# Patient Record
Sex: FEMALE | Race: OTHER | HISPANIC OR LATINO | URBAN - METROPOLITAN AREA
[De-identification: names, ages, dates, MRNs, and addresses within clinical notes are randomized per-mention and may not be internally consistent; named-entity substitution may affect disease eponyms.]

---

## 2017-04-09 ENCOUNTER — EMERGENCY (EMERGENCY)
Facility: HOSPITAL | Age: 27
LOS: 1 days | Discharge: HOME | End: 2017-04-09
Attending: EMERGENCY MEDICINE | Admitting: EMERGENCY MEDICINE
Payer: COMMERCIAL

## 2017-04-09 VITALS
OXYGEN SATURATION: 100 % | HEART RATE: 64 BPM | RESPIRATION RATE: 18 BRPM | SYSTOLIC BLOOD PRESSURE: 114 MMHG | DIASTOLIC BLOOD PRESSURE: 59 MMHG

## 2017-04-09 VITALS
HEART RATE: 85 BPM | OXYGEN SATURATION: 98 % | TEMPERATURE: 98 F | HEIGHT: 67 IN | RESPIRATION RATE: 18 BRPM | WEIGHT: 229.94 LBS | SYSTOLIC BLOOD PRESSURE: 128 MMHG | DIASTOLIC BLOOD PRESSURE: 83 MMHG

## 2017-04-09 DIAGNOSIS — O13.2 GESTATIONAL [PREGNANCY-INDUCED] HYPERTENSION WITHOUT SIGNIFICANT PROTEINURIA, SECOND TRIMESTER: ICD-10-CM

## 2017-04-09 DIAGNOSIS — Z3A.19 19 WEEKS GESTATION OF PREGNANCY: ICD-10-CM

## 2017-04-09 LAB
ALBUMIN SERPL ELPH-MCNC: 2.9 G/DL — LOW (ref 3.4–5)
ALP SERPL-CCNC: 72 U/L — SIGNIFICANT CHANGE UP (ref 40–120)
ALT FLD-CCNC: 18 U/L — SIGNIFICANT CHANGE UP (ref 12–42)
ANION GAP SERPL CALC-SCNC: 6 MMOL/L — LOW (ref 9–16)
APPEARANCE UR: CLEAR — SIGNIFICANT CHANGE UP
AST SERPL-CCNC: 22 U/L — SIGNIFICANT CHANGE UP (ref 15–37)
BILIRUB SERPL-MCNC: 0.4 MG/DL — SIGNIFICANT CHANGE UP (ref 0.2–1.2)
BILIRUB UR-MCNC: NEGATIVE — SIGNIFICANT CHANGE UP
BUN SERPL-MCNC: 5 MG/DL — LOW (ref 7–23)
CALCIUM SERPL-MCNC: 8.5 MG/DL — SIGNIFICANT CHANGE UP (ref 8.5–10.5)
CHLORIDE SERPL-SCNC: 108 MMOL/L — SIGNIFICANT CHANGE UP (ref 96–108)
CO2 SERPL-SCNC: 25 MMOL/L — SIGNIFICANT CHANGE UP (ref 22–31)
COLOR SPEC: YELLOW — SIGNIFICANT CHANGE UP
CREAT SERPL-MCNC: 0.54 MG/DL — SIGNIFICANT CHANGE UP (ref 0.5–1.3)
DIFF PNL FLD: (no result)
GLUCOSE SERPL-MCNC: 84 MG/DL — SIGNIFICANT CHANGE UP (ref 70–99)
GLUCOSE UR QL: NEGATIVE — SIGNIFICANT CHANGE UP
HCT VFR BLD CALC: 32 % — LOW (ref 34.5–45)
HGB BLD-MCNC: 11.5 G/DL — SIGNIFICANT CHANGE UP (ref 11.5–15.5)
KETONES UR-MCNC: NEGATIVE — SIGNIFICANT CHANGE UP
LDH SERPL L TO P-CCNC: 139 U/L — SIGNIFICANT CHANGE UP (ref 84–246)
LEUKOCYTE ESTERASE UR-ACNC: NEGATIVE — SIGNIFICANT CHANGE UP
MCHC RBC-ENTMCNC: 30.1 PG — SIGNIFICANT CHANGE UP (ref 27–34)
MCHC RBC-ENTMCNC: 35.9 G/DL — SIGNIFICANT CHANGE UP (ref 32–36)
MCV RBC AUTO: 83.8 FL — SIGNIFICANT CHANGE UP (ref 80–100)
NITRITE UR-MCNC: NEGATIVE — SIGNIFICANT CHANGE UP
PH UR: 7 — SIGNIFICANT CHANGE UP (ref 4–8)
PLATELET # BLD AUTO: 273 K/UL — SIGNIFICANT CHANGE UP (ref 150–400)
POTASSIUM SERPL-MCNC: 4.1 MMOL/L — SIGNIFICANT CHANGE UP (ref 3.5–5.3)
POTASSIUM SERPL-SCNC: 4.1 MMOL/L — SIGNIFICANT CHANGE UP (ref 3.5–5.3)
PROT SERPL-MCNC: 6.9 G/DL — SIGNIFICANT CHANGE UP (ref 6.4–8.2)
PROT UR-MCNC: NEGATIVE MG/DL — SIGNIFICANT CHANGE UP
RBC # BLD: 3.82 M/UL — SIGNIFICANT CHANGE UP (ref 3.8–5.2)
RBC # FLD: 12.9 % — SIGNIFICANT CHANGE UP (ref 10.3–16.9)
SODIUM SERPL-SCNC: 139 MMOL/L — SIGNIFICANT CHANGE UP (ref 135–145)
SP GR SPEC: <=1.005 — SIGNIFICANT CHANGE UP (ref 1–1.03)
URATE SERPL-MCNC: 4.7 MG/DL — SIGNIFICANT CHANGE UP (ref 2.6–6)
UROBILINOGEN FLD QL: 0.2 E.U./DL — SIGNIFICANT CHANGE UP
WBC # BLD: 10.2 K/UL — SIGNIFICANT CHANGE UP (ref 3.8–10.5)
WBC # FLD AUTO: 10.2 K/UL — SIGNIFICANT CHANGE UP (ref 3.8–10.5)

## 2017-04-09 PROCEDURE — 81003 URINALYSIS AUTO W/O SCOPE: CPT

## 2017-04-09 PROCEDURE — 99284 EMERGENCY DEPT VISIT MOD MDM: CPT

## 2017-04-09 PROCEDURE — 80053 COMPREHEN METABOLIC PANEL: CPT

## 2017-04-09 PROCEDURE — 81001 URINALYSIS AUTO W/SCOPE: CPT

## 2017-04-09 PROCEDURE — 36415 COLL VENOUS BLD VENIPUNCTURE: CPT

## 2017-04-09 PROCEDURE — 99284 EMERGENCY DEPT VISIT MOD MDM: CPT | Mod: 25

## 2017-04-09 PROCEDURE — 84550 ASSAY OF BLOOD/URIC ACID: CPT

## 2017-04-09 PROCEDURE — 85027 COMPLETE CBC AUTOMATED: CPT

## 2017-04-09 PROCEDURE — 83615 LACTATE (LD) (LDH) ENZYME: CPT

## 2017-04-09 NOTE — ED PROVIDER NOTE - OBJECTIVE STATEMENT
27F  at 19 weeks and 6 days sent in from gyn office for concern for hypertension, concern for pre-eclampsia. Hx of pre-eclampsia with first pregnancy requiring delivery at 35 weeks. Pt denies hx of hypertension, has pregnancy-induced hypertension only. No vision changes, no blurred vision, no headache, no seizures, no abdominal pain, no leg swelling. No chest pain, no shortness of breath. Does not take any medications, denies drug use. 27F  at 19 weeks and 6 days sent in from gyn office for concern for hypertension, concern for pre-eclampsia. Hx of pre-eclampsia with first pregnancy requiring delivery at 35 weeks. Pt denies hx of hypertension, has pregnancy-induced hypertension only. No vision changes, no blurred vision, no headache, no seizures, no abdominal pain, no leg swelling. No chest pain, no shortness of breath. Does not take any medications, denies drug use. Pt has placenta previa.

## 2017-04-09 NOTE — ED ADULT TRIAGE NOTE - CHIEF COMPLAINT QUOTE
Pt directed to ED by PCP for Preeclampsia.  19 weeks pregnant.  .  Pt CO mild Nausea.  Pt denies vomiting, Diarrhea, SOB, CP, Dizziness

## 2017-04-09 NOTE — ED ADULT NURSE NOTE - OBJECTIVE STATEMENT
Pt A&O3 ,19wks pregnant,  referred for elevated B/P, denies any CP,  denies SOB , denies any other complaints . On arrival to ED Pt's VSS  and B/P within normal limits  , continue monitoring

## 2017-04-09 NOTE — ED PROVIDER NOTE - NEUROLOGICAL, MLM
Alert and oriented, no focal deficits, no motor or sensory deficits. symmetric reflexes 2+ patella/achilles

## 2017-04-09 NOTE — ED PROVIDER NOTE - PROGRESS NOTE DETAILS
Spoke to Dr. Armando on phone, if blood pressures remain normal and pre-eclampsia labs are normal, can discharge patient home. Pt manjit PO, bp wnl, labs unremarkable, asymptomatic will dc home.

## 2017-04-09 NOTE — ED PROVIDER NOTE - MEDICAL DECISION MAKING DETAILS
27F with concern for pre-eclampsia, sent in by gynecology for concern for hypertension readings at clinic. Pt is asymptomatic for pre-eclampsia, no abd pain, vision changes, leg swelling or other concerns. Will send pre-eclampsia labs including cbc, liver labs, uric acid, ldh. Will continue to recycle blood pressures, no elevated bp of 140/90 or greater, will discharge home to f/u with gynecologist.

## 2017-04-09 NOTE — ED ADULT NURSE REASSESSMENT NOTE - NS ED NURSE REASSESS COMMENT FT1
Pt denies pain at this time. Pt placed on telemetry monitoring as per MD. Pt denies headache, blurred vision or numbness or tingling in BL UE and LE.  Pt stable at this time with family at bedside. Will continue to monitor.

## 2017-04-09 NOTE — ED ADULT NURSE NOTE - CHPI ED SYMPTOMS NEG
no cough/no syncope/no fever/no dizziness/no shortness of breath/no nausea/no diaphoresis/no back pain/no chills/no vomiting

## 2017-04-26 ENCOUNTER — OUTPATIENT (OUTPATIENT)
Dept: OUTPATIENT SERVICES | Age: 27
LOS: 1 days | Discharge: HOME | End: 2017-04-26

## 2017-04-26 PROBLEM — Z00.00 ENCOUNTER FOR PREVENTIVE HEALTH EXAMINATION: Status: ACTIVE | Noted: 2017-04-26

## 2017-05-01 ENCOUNTER — APPOINTMENT (OUTPATIENT)
Dept: PEDIATRIC CARDIOLOGY | Facility: CLINIC | Age: 27
End: 2017-05-01

## 2017-05-22 ENCOUNTER — APPOINTMENT (OUTPATIENT)
Dept: PEDIATRIC CARDIOLOGY | Facility: CLINIC | Age: 27
End: 2017-05-22

## 2017-08-04 ENCOUNTER — OUTPATIENT (OUTPATIENT)
Dept: OUTPATIENT SERVICES | Facility: HOSPITAL | Age: 27
LOS: 1 days | End: 2017-08-04

## 2017-08-04 ENCOUNTER — EMERGENCY (EMERGENCY)
Facility: HOSPITAL | Age: 27
LOS: 1 days | Discharge: STILL ADMITTED OR ADMINISTRATIVE | End: 2017-08-04
Admitting: EMERGENCY MEDICINE

## 2017-08-04 VITALS
TEMPERATURE: 98 F | RESPIRATION RATE: 18 BRPM | DIASTOLIC BLOOD PRESSURE: 79 MMHG | OXYGEN SATURATION: 98 % | SYSTOLIC BLOOD PRESSURE: 145 MMHG | HEART RATE: 74 BPM

## 2017-08-04 DIAGNOSIS — O26.899 OTHER SPECIFIED PREGNANCY RELATED CONDITIONS, UNSPECIFIED TRIMESTER: ICD-10-CM

## 2017-08-04 DIAGNOSIS — Z3A.00 WEEKS OF GESTATION OF PREGNANCY NOT SPECIFIED: ICD-10-CM

## 2017-08-04 LAB
APPEARANCE UR: SIGNIFICANT CHANGE UP
BACTERIA # UR AUTO: SIGNIFICANT CHANGE UP
BILIRUB UR-MCNC: NEGATIVE — SIGNIFICANT CHANGE UP
BLD GP AB SCN SERPL QL: NEGATIVE — SIGNIFICANT CHANGE UP
BLOOD UR QL VISUAL: NEGATIVE — SIGNIFICANT CHANGE UP
COLOR SPEC: YELLOW — SIGNIFICANT CHANGE UP
GLUCOSE UR-MCNC: NEGATIVE — SIGNIFICANT CHANGE UP
HCT VFR BLD CALC: 30.5 % — LOW (ref 34.5–45)
HGB BLD-MCNC: 10 G/DL — LOW (ref 11.5–15.5)
KETONES UR-MCNC: SIGNIFICANT CHANGE UP
LEUKOCYTE ESTERASE UR-ACNC: SIGNIFICANT CHANGE UP
MCHC RBC-ENTMCNC: 26.1 PG — LOW (ref 27–34)
MCHC RBC-ENTMCNC: 32.8 % — SIGNIFICANT CHANGE UP (ref 32–36)
MCV RBC AUTO: 79.6 FL — LOW (ref 80–100)
MUCOUS THREADS # UR AUTO: SIGNIFICANT CHANGE UP
NITRITE UR-MCNC: NEGATIVE — SIGNIFICANT CHANGE UP
NRBC # FLD: 0 — SIGNIFICANT CHANGE UP
PH UR: 6 — SIGNIFICANT CHANGE UP (ref 4.6–8)
PLATELET # BLD AUTO: 338 K/UL — SIGNIFICANT CHANGE UP (ref 150–400)
PMV BLD: 9.4 FL — SIGNIFICANT CHANGE UP (ref 7–13)
PROT UR-MCNC: 20 — SIGNIFICANT CHANGE UP
RBC # BLD: 3.83 M/UL — SIGNIFICANT CHANGE UP (ref 3.8–5.2)
RBC # FLD: 14.7 % — HIGH (ref 10.3–14.5)
RBC CASTS # UR COMP ASSIST: SIGNIFICANT CHANGE UP (ref 0–?)
RH IG SCN BLD-IMP: POSITIVE — SIGNIFICANT CHANGE UP
RH IG SCN BLD-IMP: POSITIVE — SIGNIFICANT CHANGE UP
SP GR SPEC: 1.01 — SIGNIFICANT CHANGE UP (ref 1–1.03)
SQUAMOUS # UR AUTO: SIGNIFICANT CHANGE UP
UROBILINOGEN FLD QL: NORMAL E.U. — SIGNIFICANT CHANGE UP (ref 0.1–0.2)
WBC # BLD: 10.5 K/UL — SIGNIFICANT CHANGE UP (ref 3.8–10.5)
WBC # FLD AUTO: 10.5 K/UL — SIGNIFICANT CHANGE UP (ref 3.8–10.5)
WBC UR QL: SIGNIFICANT CHANGE UP (ref 0–?)

## 2017-08-04 RX ORDER — SODIUM CHLORIDE 9 MG/ML
1000 INJECTION, SOLUTION INTRAVENOUS
Qty: 0 | Refills: 0 | Status: DISCONTINUED | OUTPATIENT
Start: 2017-08-04 | End: 2017-08-19

## 2017-08-04 RX ORDER — SODIUM CHLORIDE 9 MG/ML
500 INJECTION, SOLUTION INTRAVENOUS ONCE
Qty: 0 | Refills: 0 | Status: DISCONTINUED | OUTPATIENT
Start: 2017-08-04 | End: 2017-08-04

## 2017-08-07 ENCOUNTER — INPATIENT (INPATIENT)
Facility: HOSPITAL | Age: 27
LOS: 0 days | Discharge: HOME | DRG: 780 | End: 2017-08-08
Attending: OBSTETRICS & GYNECOLOGY | Admitting: OBSTETRICS & GYNECOLOGY
Payer: MEDICAID

## 2017-08-07 DIAGNOSIS — O26.899 OTHER SPECIFIED PREGNANCY RELATED CONDITIONS, UNSPECIFIED TRIMESTER: ICD-10-CM

## 2017-08-07 DIAGNOSIS — Z3A.00 WEEKS OF GESTATION OF PREGNANCY NOT SPECIFIED: ICD-10-CM

## 2017-08-07 DIAGNOSIS — Z34.80 ENCOUNTER FOR SUPERVISION OF OTHER NORMAL PREGNANCY, UNSPECIFIED TRIMESTER: ICD-10-CM

## 2017-08-07 LAB
APTT BLD: 25 SEC — LOW (ref 27.5–37.4)
BASOPHILS # BLD AUTO: 0 K/UL — SIGNIFICANT CHANGE UP (ref 0–0.2)
BASOPHILS NFR BLD AUTO: 0.4 % — SIGNIFICANT CHANGE UP (ref 0–2)
EOSINOPHIL # BLD AUTO: 0 K/UL — SIGNIFICANT CHANGE UP (ref 0–0.5)
EOSINOPHIL NFR BLD AUTO: 0.5 % — SIGNIFICANT CHANGE UP (ref 0–6)
FIBRINOGEN PPP-MCNC: 671 MG/DL — HIGH (ref 310–510)
HBV SURFACE AG SERPL QL IA: SIGNIFICANT CHANGE UP
HCT VFR BLD CALC: 32.9 % — LOW (ref 34.5–45)
HGB BLD-MCNC: 10.5 G/DL — LOW (ref 11.5–15.5)
INR BLD: 1.04 RATIO — SIGNIFICANT CHANGE UP (ref 0.88–1.16)
LYMPHOCYTES # BLD AUTO: 1.9 K/UL — SIGNIFICANT CHANGE UP (ref 1–3.3)
LYMPHOCYTES # BLD AUTO: 18.5 % — SIGNIFICANT CHANGE UP (ref 13–44)
MCHC RBC-ENTMCNC: 26.3 PG — LOW (ref 27–34)
MCHC RBC-ENTMCNC: 32 GM/DL — SIGNIFICANT CHANGE UP (ref 32–36)
MCV RBC AUTO: 82 FL — SIGNIFICANT CHANGE UP (ref 80–100)
MONOCYTES # BLD AUTO: 0.4 K/UL — SIGNIFICANT CHANGE UP (ref 0–0.9)
MONOCYTES NFR BLD AUTO: 3.6 % — SIGNIFICANT CHANGE UP (ref 2–14)
NEUTROPHILS # BLD AUTO: 7.8 K/UL — HIGH (ref 1.8–7.4)
NEUTROPHILS NFR BLD AUTO: 77 % — SIGNIFICANT CHANGE UP (ref 43–77)
PLATELET # BLD AUTO: 321 K/UL — SIGNIFICANT CHANGE UP (ref 150–400)
PROTHROM AB SERPL-ACNC: 11.4 SEC — SIGNIFICANT CHANGE UP (ref 9.8–12.7)
RBC # BLD: 4.01 M/UL — SIGNIFICANT CHANGE UP (ref 3.8–5.2)
RBC # FLD: 14.7 % — HIGH (ref 10.3–14.5)
WBC # BLD: 10.2 K/UL — SIGNIFICANT CHANGE UP (ref 3.8–10.5)
WBC # FLD AUTO: 10.2 K/UL — SIGNIFICANT CHANGE UP (ref 3.8–10.5)

## 2017-08-07 PROCEDURE — 85730 THROMBOPLASTIN TIME PARTIAL: CPT

## 2017-08-07 PROCEDURE — 85384 FIBRINOGEN ACTIVITY: CPT

## 2017-08-07 PROCEDURE — 86850 RBC ANTIBODY SCREEN: CPT

## 2017-08-07 PROCEDURE — 85027 COMPLETE CBC AUTOMATED: CPT

## 2017-08-07 PROCEDURE — 86900 BLOOD TYPING SEROLOGIC ABO: CPT

## 2017-08-07 PROCEDURE — 59025 FETAL NON-STRESS TEST: CPT

## 2017-08-07 PROCEDURE — 86901 BLOOD TYPING SEROLOGIC RH(D): CPT

## 2017-08-07 PROCEDURE — 86920 COMPATIBILITY TEST SPIN: CPT

## 2017-08-07 PROCEDURE — G0463: CPT

## 2017-08-07 PROCEDURE — 87340 HEPATITIS B SURFACE AG IA: CPT

## 2017-08-07 PROCEDURE — 86780 TREPONEMA PALLIDUM: CPT

## 2017-08-07 PROCEDURE — 85610 PROTHROMBIN TIME: CPT

## 2017-08-07 RX ORDER — CITRIC ACID/SODIUM CITRATE 300-500 MG
30 SOLUTION, ORAL ORAL ONCE
Qty: 0 | Refills: 0 | Status: DISCONTINUED | OUTPATIENT
Start: 2017-08-07 | End: 2017-08-08

## 2017-08-07 RX ORDER — SODIUM CHLORIDE 9 MG/ML
1000 INJECTION, SOLUTION INTRAVENOUS ONCE
Qty: 0 | Refills: 0 | Status: COMPLETED | OUTPATIENT
Start: 2017-08-07 | End: 2017-08-07

## 2017-08-07 RX ORDER — TENOFOVIR DISOPROXIL FUMARATE 300 MG/1
300 TABLET, FILM COATED ORAL
Qty: 0 | Refills: 0 | Status: DISCONTINUED | OUTPATIENT
Start: 2017-08-07 | End: 2017-08-07

## 2017-08-07 RX ORDER — EMTRICITABINE, RILPIVIRINE HYDROCHLORIDE, AND TENOFOVIR DISOPROXIL FUMARATE 200; 25; 300 MG/1; MG/1; MG/1
1 TABLET, FILM COATED ORAL
Qty: 0 | Refills: 0 | Status: DISCONTINUED | OUTPATIENT
Start: 2017-08-07 | End: 2017-08-07

## 2017-08-07 RX ORDER — CEFAZOLIN SODIUM 1 G
2000 VIAL (EA) INJECTION ONCE
Qty: 0 | Refills: 0 | Status: DISCONTINUED | OUTPATIENT
Start: 2017-08-07 | End: 2017-08-08

## 2017-08-07 RX ORDER — EMTRICITABINE AND TENOFOVIR DISOPROXIL FUMARATE 200; 300 MG/1; MG/1
1 TABLET, FILM COATED ORAL DAILY
Qty: 0 | Refills: 0 | Status: DISCONTINUED | OUTPATIENT
Start: 2017-08-07 | End: 2017-08-08

## 2017-08-07 RX ORDER — METOCLOPRAMIDE HCL 10 MG
10 TABLET ORAL ONCE
Qty: 0 | Refills: 0 | Status: DISCONTINUED | OUTPATIENT
Start: 2017-08-07 | End: 2017-08-08

## 2017-08-07 RX ORDER — RILPIVIRINE HYDROCHLORIDE 25 MG/1
25 TABLET, FILM COATED ORAL
Qty: 0 | Refills: 0 | Status: DISCONTINUED | OUTPATIENT
Start: 2017-08-07 | End: 2017-08-08

## 2017-08-07 RX ORDER — SODIUM CHLORIDE 9 MG/ML
1000 INJECTION, SOLUTION INTRAVENOUS
Qty: 0 | Refills: 0 | Status: DISCONTINUED | OUTPATIENT
Start: 2017-08-07 | End: 2017-08-08

## 2017-08-07 RX ORDER — EMTRICITABINE 200 MG/1
200 CAPSULE ORAL DAILY
Qty: 0 | Refills: 0 | Status: DISCONTINUED | OUTPATIENT
Start: 2017-08-07 | End: 2017-08-07

## 2017-08-07 RX ADMIN — SODIUM CHLORIDE 2000 MILLILITER(S): 9 INJECTION, SOLUTION INTRAVENOUS at 17:45

## 2017-08-08 ENCOUNTER — TRANSCRIPTION ENCOUNTER (OUTPATIENT)
Age: 27
End: 2017-08-08

## 2017-08-08 LAB — T PALLIDUM AB TITR SER: NEGATIVE — SIGNIFICANT CHANGE UP

## 2017-08-08 RX ORDER — EMTRICITABINE, RILPIVIRINE HYDROCHLORIDE, AND TENOFOVIR DISOPROXIL FUMARATE 200; 25; 300 MG/1; MG/1; MG/1
1 TABLET, FILM COATED ORAL
Qty: 0 | Refills: 0 | COMMUNITY

## 2017-08-08 NOTE — DISCHARGE NOTE ANTEPARTUM - HOSPITAL COURSE
patient presented to triage with c./o contractions h/o previous c/s for preeclampsia h/o HIV undetected viral load. patient h/o placenta previa which has resolved posterior on ultrasound by M 7/31. labor was ruled out.   patient instructed to f.u within 48hrs of discharge.  continue prenatal vitamins, f/u with Dr. Olanescu tomorrow as discussed, labor precautions given, kick count instructions given, return to labor room with any maternal.fetal concerns, d/w Dr. Olanescu at bedside

## 2017-08-08 NOTE — DISCHARGE NOTE ANTEPARTUM - MEDICATION SUMMARY - MEDICATIONS TO TAKE
I will START or STAY ON the medications listed below when I get home from the hospital:    Prenatal Multivitamins oral tablet  --  by mouth   -- Indication: For pregnancy

## 2017-08-08 NOTE — DISCHARGE NOTE ANTEPARTUM - ADDITIONAL INSTRUCTIONS
continue prenatal vitamins, f/u with Dr. Olanescu tomorrow as discussed, labor precautions given, kick count instructions given, return to labor room with any maternal.fetal concerns, d/w Dr. Olanescu at bedside

## 2017-08-08 NOTE — DISCHARGE NOTE ANTEPARTUM - PATIENT PORTAL LINK FT
“You can access the Follow[employer] Patient Portal, offered by [facility], by registering with the following website: [email or web address]”

## 2017-08-08 NOTE — DISCHARGE NOTE ANTEPARTUM - PLAN OF CARE
r/o labor, no cervical change continue prenatal vitamins, f/u with Dr. Olanescu tomorrow as discussed, labor precautions given, kick count instructions given, return to labor room with any maternal.fetal concerns, d/w Dr. Olanescu at bedside

## 2017-08-08 NOTE — DISCHARGE NOTE ANTEPARTUM - CARE PROVIDER_API CALL
Olanescu, Andrea D (MD), Obstetrics and Gynecology  [address]  [address] [place] [address]  Phone: [phone]  Fax: [phone]    Jose Bingham), Obstetrics  Gynecology  [address] [facility] [facility]  [address]  [place] [address]  Phone: [phone]  Fax: [phone]

## 2017-08-14 DIAGNOSIS — O47.1 FALSE LABOR AT OR AFTER 37 COMPLETED WEEKS OF GESTATION: ICD-10-CM

## 2017-08-15 ENCOUNTER — RESULT REVIEW (OUTPATIENT)
Age: 27
End: 2017-08-15

## 2017-08-15 ENCOUNTER — INPATIENT (INPATIENT)
Facility: HOSPITAL | Age: 27
LOS: 2 days | Discharge: HOME | End: 2017-08-18
Attending: OBSTETRICS & GYNECOLOGY | Admitting: OBSTETRICS & GYNECOLOGY
Payer: MEDICAID

## 2017-08-15 ENCOUNTER — TRANSCRIPTION ENCOUNTER (OUTPATIENT)
Age: 27
End: 2017-08-15

## 2017-08-15 VITALS — WEIGHT: 209.44 LBS

## 2017-08-15 DIAGNOSIS — Z3A.37 37 WEEKS GESTATION OF PREGNANCY: ICD-10-CM

## 2017-08-15 DIAGNOSIS — Z21 ASYMPTOMATIC HUMAN IMMUNODEFICIENCY VIRUS [HIV] INFECTION STATUS: ICD-10-CM

## 2017-08-15 DIAGNOSIS — Z3A.39 39 WEEKS GESTATION OF PREGNANCY: ICD-10-CM

## 2017-08-15 DIAGNOSIS — O98.713 HUMAN IMMUNODEFICIENCY VIRUS [HIV] DISEASE COMPLICATING PREGNANCY, THIRD TRIMESTER: ICD-10-CM

## 2017-08-15 LAB
ALBUMIN SERPL ELPH-MCNC: 2.5 G/DL — LOW (ref 3.5–5)
ALP SERPL-CCNC: 166 U/L — HIGH (ref 40–120)
ALT FLD-CCNC: 15 U/L DA — SIGNIFICANT CHANGE UP (ref 10–60)
ANION GAP SERPL CALC-SCNC: 7 MMOL/L — SIGNIFICANT CHANGE UP (ref 5–17)
APTT BLD: 22.5 SEC — LOW (ref 27.5–37.4)
AST SERPL-CCNC: 20 U/L — SIGNIFICANT CHANGE UP (ref 10–40)
BASOPHILS # BLD AUTO: 0 K/UL — SIGNIFICANT CHANGE UP (ref 0–0.2)
BASOPHILS NFR BLD AUTO: 0.4 % — SIGNIFICANT CHANGE UP (ref 0–2)
BILIRUB SERPL-MCNC: 0.6 MG/DL — SIGNIFICANT CHANGE UP (ref 0.2–1.2)
BUN SERPL-MCNC: 4 MG/DL — LOW (ref 7–18)
CALCIUM SERPL-MCNC: 8.5 MG/DL — SIGNIFICANT CHANGE UP (ref 8.4–10.5)
CHLORIDE SERPL-SCNC: 108 MMOL/L — SIGNIFICANT CHANGE UP (ref 96–108)
CO2 SERPL-SCNC: 25 MMOL/L — SIGNIFICANT CHANGE UP (ref 22–31)
CREAT SERPL-MCNC: 0.62 MG/DL — SIGNIFICANT CHANGE UP (ref 0.5–1.3)
EOSINOPHIL # BLD AUTO: 0.1 K/UL — SIGNIFICANT CHANGE UP (ref 0–0.5)
EOSINOPHIL NFR BLD AUTO: 0.6 % — SIGNIFICANT CHANGE UP (ref 0–6)
FIBRINOGEN PPP-MCNC: 596 MG/DL — HIGH (ref 310–510)
GLUCOSE SERPL-MCNC: 95 MG/DL — SIGNIFICANT CHANGE UP (ref 70–99)
HCT VFR BLD CALC: 24.7 % — LOW (ref 34.5–45)
HCT VFR BLD CALC: 31.2 % — LOW (ref 34.5–45)
HGB BLD-MCNC: 10.1 G/DL — LOW (ref 11.5–15.5)
HGB BLD-MCNC: 7.7 G/DL — LOW (ref 11.5–15.5)
INR BLD: 0.99 RATIO — SIGNIFICANT CHANGE UP (ref 0.88–1.16)
LDH SERPL L TO P-CCNC: 128 U/L — SIGNIFICANT CHANGE UP (ref 120–225)
LYMPHOCYTES # BLD AUTO: 2.2 K/UL — SIGNIFICANT CHANGE UP (ref 1–3.3)
LYMPHOCYTES # BLD AUTO: 20.5 % — SIGNIFICANT CHANGE UP (ref 13–44)
MCHC RBC-ENTMCNC: 25.5 PG — LOW (ref 27–34)
MCHC RBC-ENTMCNC: 26.1 PG — LOW (ref 27–34)
MCHC RBC-ENTMCNC: 31.3 GM/DL — LOW (ref 32–36)
MCHC RBC-ENTMCNC: 32.3 GM/DL — SIGNIFICANT CHANGE UP (ref 32–36)
MCV RBC AUTO: 80.8 FL — SIGNIFICANT CHANGE UP (ref 80–100)
MCV RBC AUTO: 81.6 FL — SIGNIFICANT CHANGE UP (ref 80–100)
MONOCYTES # BLD AUTO: 0.5 K/UL — SIGNIFICANT CHANGE UP (ref 0–0.9)
MONOCYTES NFR BLD AUTO: 4.5 % — SIGNIFICANT CHANGE UP (ref 2–14)
NEUTROPHILS # BLD AUTO: 7.9 K/UL — HIGH (ref 1.8–7.4)
NEUTROPHILS NFR BLD AUTO: 74 % — SIGNIFICANT CHANGE UP (ref 43–77)
PLATELET # BLD AUTO: 265 K/UL — SIGNIFICANT CHANGE UP (ref 150–400)
PLATELET # BLD AUTO: 350 K/UL — SIGNIFICANT CHANGE UP (ref 150–400)
POTASSIUM SERPL-MCNC: 3.9 MMOL/L — SIGNIFICANT CHANGE UP (ref 3.5–5.3)
POTASSIUM SERPL-SCNC: 3.9 MMOL/L — SIGNIFICANT CHANGE UP (ref 3.5–5.3)
PROT SERPL-MCNC: 6.6 G/DL — SIGNIFICANT CHANGE UP (ref 6–8.3)
PROTHROM AB SERPL-ACNC: 10.8 SEC — SIGNIFICANT CHANGE UP (ref 9.8–12.7)
RBC # BLD: 3.03 M/UL — LOW (ref 3.8–5.2)
RBC # BLD: 3.86 M/UL — SIGNIFICANT CHANGE UP (ref 3.8–5.2)
RBC # FLD: 14.7 % — HIGH (ref 10.3–14.5)
RBC # FLD: 14.9 % — HIGH (ref 10.3–14.5)
SODIUM SERPL-SCNC: 140 MMOL/L — SIGNIFICANT CHANGE UP (ref 135–145)
URATE SERPL-MCNC: 5.9 MG/DL — SIGNIFICANT CHANGE UP (ref 2.5–7)
WBC # BLD: 10.7 K/UL — HIGH (ref 3.8–10.5)
WBC # BLD: 12.4 K/UL — HIGH (ref 3.8–10.5)
WBC # FLD AUTO: 10.7 K/UL — HIGH (ref 3.8–10.5)
WBC # FLD AUTO: 12.4 K/UL — HIGH (ref 3.8–10.5)

## 2017-08-15 PROCEDURE — 88307 TISSUE EXAM BY PATHOLOGIST: CPT | Mod: 26

## 2017-08-15 RX ORDER — HYDROMORPHONE HYDROCHLORIDE 2 MG/ML
2 INJECTION INTRAMUSCULAR; INTRAVENOUS; SUBCUTANEOUS ONCE
Qty: 0 | Refills: 0 | Status: DISCONTINUED | OUTPATIENT
Start: 2017-08-15 | End: 2017-08-15

## 2017-08-15 RX ORDER — ZOLPIDEM TARTRATE 10 MG/1
5 TABLET ORAL AT BEDTIME
Qty: 0 | Refills: 0 | Status: DISCONTINUED | OUTPATIENT
Start: 2017-08-15 | End: 2017-08-18

## 2017-08-15 RX ORDER — MORPHINE SULFATE 50 MG/1
4 CAPSULE, EXTENDED RELEASE ORAL EVERY 4 HOURS
Qty: 0 | Refills: 0 | Status: DISCONTINUED | OUTPATIENT
Start: 2017-08-15 | End: 2017-08-16

## 2017-08-15 RX ORDER — SODIUM CHLORIDE 9 MG/ML
1000 INJECTION, SOLUTION INTRAVENOUS ONCE
Qty: 0 | Refills: 0 | Status: DISCONTINUED | OUTPATIENT
Start: 2017-08-15 | End: 2017-08-15

## 2017-08-15 RX ORDER — FERROUS SULFATE 325(65) MG
325 TABLET ORAL DAILY
Qty: 0 | Refills: 0 | Status: DISCONTINUED | OUTPATIENT
Start: 2017-08-15 | End: 2017-08-15

## 2017-08-15 RX ORDER — TETANUS TOXOID, REDUCED DIPHTHERIA TOXOID AND ACELLULAR PERTUSSIS VACCINE, ADSORBED 5; 2.5; 8; 8; 2.5 [IU]/.5ML; [IU]/.5ML; UG/.5ML; UG/.5ML; UG/.5ML
0.5 SUSPENSION INTRAMUSCULAR ONCE
Qty: 0 | Refills: 0 | Status: COMPLETED | OUTPATIENT
Start: 2017-08-15 | End: 2018-07-14

## 2017-08-15 RX ORDER — FOLIC ACID 0.8 MG
1 TABLET ORAL DAILY
Qty: 0 | Refills: 0 | Status: DISCONTINUED | OUTPATIENT
Start: 2017-08-16 | End: 2017-08-18

## 2017-08-15 RX ORDER — OXYCODONE AND ACETAMINOPHEN 5; 325 MG/1; MG/1
1 TABLET ORAL
Qty: 0 | Refills: 0 | Status: DISCONTINUED | OUTPATIENT
Start: 2017-08-15 | End: 2017-08-15

## 2017-08-15 RX ORDER — SODIUM CHLORIDE 9 MG/ML
1000 INJECTION, SOLUTION INTRAVENOUS
Qty: 0 | Refills: 0 | Status: DISCONTINUED | OUTPATIENT
Start: 2017-08-15 | End: 2017-08-16

## 2017-08-15 RX ORDER — DIPHENHYDRAMINE HCL 50 MG
25 CAPSULE ORAL EVERY 6 HOURS
Qty: 0 | Refills: 0 | Status: DISCONTINUED | OUTPATIENT
Start: 2017-08-15 | End: 2017-08-18

## 2017-08-15 RX ORDER — OXYCODONE AND ACETAMINOPHEN 5; 325 MG/1; MG/1
2 TABLET ORAL EVERY 4 HOURS
Qty: 0 | Refills: 0 | Status: DISCONTINUED | OUTPATIENT
Start: 2017-08-15 | End: 2017-08-15

## 2017-08-15 RX ORDER — FOLIC ACID 0.8 MG
1 TABLET ORAL DAILY
Qty: 0 | Refills: 0 | Status: DISCONTINUED | OUTPATIENT
Start: 2017-08-15 | End: 2017-08-15

## 2017-08-15 RX ORDER — CEFAZOLIN SODIUM 1 G
2000 VIAL (EA) INJECTION ONCE
Qty: 0 | Refills: 0 | Status: COMPLETED | OUTPATIENT
Start: 2017-08-15 | End: 2017-08-15

## 2017-08-15 RX ORDER — MORPHINE SULFATE 50 MG/1
8 CAPSULE, EXTENDED RELEASE ORAL ONCE
Qty: 0 | Refills: 0 | Status: DISCONTINUED | OUTPATIENT
Start: 2017-08-15 | End: 2017-08-18

## 2017-08-15 RX ORDER — SIMETHICONE 80 MG/1
80 TABLET, CHEWABLE ORAL EVERY 4 HOURS
Qty: 0 | Refills: 0 | Status: DISCONTINUED | OUTPATIENT
Start: 2017-08-15 | End: 2017-08-16

## 2017-08-15 RX ORDER — ENOXAPARIN SODIUM 100 MG/ML
40 INJECTION SUBCUTANEOUS DAILY
Qty: 0 | Refills: 0 | Status: DISCONTINUED | OUTPATIENT
Start: 2017-08-16 | End: 2017-08-18

## 2017-08-15 RX ORDER — OXYTOCIN 10 UNIT/ML
41.67 VIAL (ML) INJECTION
Qty: 20 | Refills: 0 | Status: DISCONTINUED | OUTPATIENT
Start: 2017-08-15 | End: 2017-08-18

## 2017-08-15 RX ORDER — CITRIC ACID/SODIUM CITRATE 300-500 MG
30 SOLUTION, ORAL ORAL ONCE
Qty: 0 | Refills: 0 | Status: COMPLETED | OUTPATIENT
Start: 2017-08-15 | End: 2017-08-15

## 2017-08-15 RX ORDER — ACETAMINOPHEN 500 MG
650 TABLET ORAL EVERY 6 HOURS
Qty: 0 | Refills: 0 | Status: DISCONTINUED | OUTPATIENT
Start: 2017-08-15 | End: 2017-08-18

## 2017-08-15 RX ORDER — DOCUSATE SODIUM 100 MG
100 CAPSULE ORAL
Qty: 0 | Refills: 0 | Status: DISCONTINUED | OUTPATIENT
Start: 2017-08-15 | End: 2017-08-16

## 2017-08-15 RX ORDER — SODIUM CHLORIDE 9 MG/ML
1000 INJECTION, SOLUTION INTRAVENOUS
Qty: 0 | Refills: 0 | Status: DISCONTINUED | OUTPATIENT
Start: 2017-08-15 | End: 2017-08-15

## 2017-08-15 RX ORDER — LANOLIN
1 OINTMENT (GRAM) TOPICAL
Qty: 0 | Refills: 0 | Status: DISCONTINUED | OUTPATIENT
Start: 2017-08-15 | End: 2017-08-18

## 2017-08-15 RX ORDER — FERROUS SULFATE 325(65) MG
325 TABLET ORAL
Qty: 0 | Refills: 0 | Status: DISCONTINUED | OUTPATIENT
Start: 2017-08-16 | End: 2017-08-16

## 2017-08-15 RX ORDER — METOCLOPRAMIDE HCL 10 MG
10 TABLET ORAL ONCE
Qty: 0 | Refills: 0 | Status: DISCONTINUED | OUTPATIENT
Start: 2017-08-15 | End: 2017-08-15

## 2017-08-15 RX ORDER — RILPIVIRINE HYDROCHLORIDE 25 MG/1
25 TABLET, FILM COATED ORAL AT BEDTIME
Qty: 0 | Refills: 0 | Status: DISCONTINUED | OUTPATIENT
Start: 2017-08-16 | End: 2017-08-16

## 2017-08-15 RX ORDER — KETOROLAC TROMETHAMINE 30 MG/ML
30 SYRINGE (ML) INJECTION EVERY 6 HOURS
Qty: 0 | Refills: 0 | Status: DISCONTINUED | OUTPATIENT
Start: 2017-08-15 | End: 2017-08-16

## 2017-08-15 RX ORDER — EMTRICITABINE AND TENOFOVIR DISOPROXIL FUMARATE 200; 300 MG/1; MG/1
1 TABLET, FILM COATED ORAL AT BEDTIME
Qty: 0 | Refills: 0 | Status: DISCONTINUED | OUTPATIENT
Start: 2017-08-16 | End: 2017-08-16

## 2017-08-15 RX ORDER — ACETAMINOPHEN 500 MG
1000 TABLET ORAL ONCE
Qty: 0 | Refills: 0 | Status: COMPLETED | OUTPATIENT
Start: 2017-08-15 | End: 2017-08-15

## 2017-08-15 RX ADMIN — Medication 30 MILLIGRAM(S): at 14:56

## 2017-08-15 RX ADMIN — MORPHINE SULFATE 4 MILLIGRAM(S): 50 CAPSULE, EXTENDED RELEASE ORAL at 23:14

## 2017-08-15 RX ADMIN — Medication 0.2 MILLIGRAM(S): at 23:38

## 2017-08-15 RX ADMIN — Medication 30 MILLIGRAM(S): at 14:14

## 2017-08-15 RX ADMIN — SODIUM CHLORIDE 125 MILLILITER(S): 9 INJECTION, SOLUTION INTRAVENOUS at 05:50

## 2017-08-15 RX ADMIN — Medication 30 MILLIGRAM(S): at 22:11

## 2017-08-15 RX ADMIN — MORPHINE SULFATE 4 MILLIGRAM(S): 50 CAPSULE, EXTENDED RELEASE ORAL at 16:01

## 2017-08-15 RX ADMIN — Medication 400 MILLIGRAM(S): at 15:30

## 2017-08-15 RX ADMIN — MORPHINE SULFATE 4 MILLIGRAM(S): 50 CAPSULE, EXTENDED RELEASE ORAL at 16:35

## 2017-08-15 RX ADMIN — HYDROMORPHONE HYDROCHLORIDE 2 MILLIGRAM(S): 2 INJECTION INTRAMUSCULAR; INTRAVENOUS; SUBCUTANEOUS at 17:50

## 2017-08-15 RX ADMIN — Medication 30 MILLILITER(S): at 10:53

## 2017-08-15 RX ADMIN — Medication 30 MILLIGRAM(S): at 21:29

## 2017-08-15 RX ADMIN — Medication 1000 MILLIGRAM(S): at 16:17

## 2017-08-15 RX ADMIN — HYDROMORPHONE HYDROCHLORIDE 2 MILLIGRAM(S): 2 INJECTION INTRAMUSCULAR; INTRAVENOUS; SUBCUTANEOUS at 17:17

## 2017-08-15 RX ADMIN — Medication 100 MILLIGRAM(S): at 10:53

## 2017-08-15 RX ADMIN — MORPHINE SULFATE 4 MILLIGRAM(S): 50 CAPSULE, EXTENDED RELEASE ORAL at 23:38

## 2017-08-15 NOTE — CHART NOTE - NSCHARTNOTEFT_GEN_A_CORE
Pt seen at bedside c/o pain requesting for pain meds. Denies n/v, cp; sob; palpitations; or dizziness, + burping    T(C): 36.6 (08-15-17 @ 15:55), Max: 36.6 (08-15-17 @ 15:55)  HR: 62 (08-15-17 @ 15:55) (62 - 62)  BP: 127/64 (08-15-17 @ 15:55) (127/64 - 127/64)  RR: 16 (08-15-17 @ 15:55) (16 - 16)  SpO2: 100% (08-15-17 @ 15:55) (100% - 100%)    abd: soft/nt, fundus firm, dressing in place C/D/I  pelvic: mod lochia  ext: venodynes in place; no calf pain    a/p POD #0 s/p sched rpt c/s; HIV +   cont close monitor   post op care  pain management  f/u cbc  d/w dr Olanescu

## 2017-08-16 ENCOUNTER — TRANSCRIPTION ENCOUNTER (OUTPATIENT)
Age: 27
End: 2017-08-16

## 2017-08-16 DIAGNOSIS — D62 ACUTE POSTHEMORRHAGIC ANEMIA: ICD-10-CM

## 2017-08-16 DIAGNOSIS — Z21 ASYMPTOMATIC HUMAN IMMUNODEFICIENCY VIRUS [HIV] INFECTION STATUS: ICD-10-CM

## 2017-08-16 LAB
BASOPHILS # BLD AUTO: 0.1 K/UL — SIGNIFICANT CHANGE UP (ref 0–0.2)
BASOPHILS NFR BLD AUTO: 0.5 % — SIGNIFICANT CHANGE UP (ref 0–2)
EOSINOPHIL # BLD AUTO: 0.1 K/UL — SIGNIFICANT CHANGE UP (ref 0–0.5)
EOSINOPHIL NFR BLD AUTO: 0.9 % — SIGNIFICANT CHANGE UP (ref 0–6)
HCT VFR BLD CALC: 24 % — LOW (ref 34.5–45)
HGB BLD-MCNC: 7.7 G/DL — LOW (ref 11.5–15.5)
LYMPHOCYTES # BLD AUTO: 2.1 K/UL — SIGNIFICANT CHANGE UP (ref 1–3.3)
LYMPHOCYTES # BLD AUTO: 20.2 % — SIGNIFICANT CHANGE UP (ref 13–44)
MCHC RBC-ENTMCNC: 26 PG — LOW (ref 27–34)
MCHC RBC-ENTMCNC: 32.1 GM/DL — SIGNIFICANT CHANGE UP (ref 32–36)
MCV RBC AUTO: 81.1 FL — SIGNIFICANT CHANGE UP (ref 80–100)
MONOCYTES # BLD AUTO: 0.6 K/UL — SIGNIFICANT CHANGE UP (ref 0–0.9)
MONOCYTES NFR BLD AUTO: 5.7 % — SIGNIFICANT CHANGE UP (ref 2–14)
NEUTROPHILS # BLD AUTO: 7.5 K/UL — HIGH (ref 1.8–7.4)
NEUTROPHILS NFR BLD AUTO: 72.7 % — SIGNIFICANT CHANGE UP (ref 43–77)
PLATELET # BLD AUTO: 268 K/UL — SIGNIFICANT CHANGE UP (ref 150–400)
RBC # BLD: 2.96 M/UL — LOW (ref 3.8–5.2)
RBC # FLD: 14.9 % — HIGH (ref 10.3–14.5)
T PALLIDUM AB TITR SER: NEGATIVE — SIGNIFICANT CHANGE UP
WBC # BLD: 10.4 K/UL — SIGNIFICANT CHANGE UP (ref 3.8–10.5)
WBC # FLD AUTO: 10.4 K/UL — SIGNIFICANT CHANGE UP (ref 3.8–10.5)

## 2017-08-16 RX ORDER — IRON SUCROSE 20 MG/ML
200 INJECTION, SOLUTION INTRAVENOUS DAILY
Qty: 0 | Refills: 0 | Status: DISCONTINUED | OUTPATIENT
Start: 2017-08-16 | End: 2017-08-16

## 2017-08-16 RX ORDER — SENNA PLUS 8.6 MG/1
2 TABLET ORAL AT BEDTIME
Qty: 0 | Refills: 0 | Status: DISCONTINUED | OUTPATIENT
Start: 2017-08-16 | End: 2017-08-18

## 2017-08-16 RX ORDER — ACETAMINOPHEN 500 MG
1000 TABLET ORAL ONCE
Qty: 0 | Refills: 0 | Status: DISCONTINUED | OUTPATIENT
Start: 2017-08-16 | End: 2017-08-18

## 2017-08-16 RX ORDER — MAGNESIUM HYDROXIDE 400 MG/1
30 TABLET, CHEWABLE ORAL DAILY
Qty: 0 | Refills: 0 | Status: DISCONTINUED | OUTPATIENT
Start: 2017-08-16 | End: 2017-08-18

## 2017-08-16 RX ORDER — IBUPROFEN 200 MG
600 TABLET ORAL EVERY 6 HOURS
Qty: 0 | Refills: 0 | Status: DISCONTINUED | OUTPATIENT
Start: 2017-08-16 | End: 2017-08-16

## 2017-08-16 RX ORDER — EMTRICITABINE, RILPIVIRINE HYDROCHLORIDE, AND TENOFOVIR DISOPROXIL FUMARATE 200; 25; 300 MG/1; MG/1; MG/1
1 TABLET, FILM COATED ORAL
Qty: 0 | Refills: 0 | Status: DISCONTINUED | OUTPATIENT
Start: 2017-08-16 | End: 2017-08-18

## 2017-08-16 RX ORDER — SIMETHICONE 80 MG/1
80 TABLET, CHEWABLE ORAL
Qty: 0 | Refills: 0 | Status: DISCONTINUED | OUTPATIENT
Start: 2017-08-16 | End: 2017-08-18

## 2017-08-16 RX ORDER — IBUPROFEN 200 MG
600 TABLET ORAL EVERY 6 HOURS
Qty: 0 | Refills: 0 | Status: DISCONTINUED | OUTPATIENT
Start: 2017-08-16 | End: 2017-08-18

## 2017-08-16 RX ORDER — IRON SUCROSE 20 MG/ML
200 INJECTION, SOLUTION INTRAVENOUS DAILY
Qty: 0 | Refills: 0 | Status: COMPLETED | OUTPATIENT
Start: 2017-08-16 | End: 2017-08-18

## 2017-08-16 RX ORDER — KETOROLAC TROMETHAMINE 30 MG/ML
30 SYRINGE (ML) INJECTION EVERY 6 HOURS
Qty: 0 | Refills: 0 | Status: DISCONTINUED | OUTPATIENT
Start: 2017-08-16 | End: 2017-08-16

## 2017-08-16 RX ORDER — OXYCODONE AND ACETAMINOPHEN 5; 325 MG/1; MG/1
2 TABLET ORAL EVERY 6 HOURS
Qty: 0 | Refills: 0 | Status: DISCONTINUED | OUTPATIENT
Start: 2017-08-16 | End: 2017-08-18

## 2017-08-16 RX ORDER — DOCUSATE SODIUM 100 MG
100 CAPSULE ORAL THREE TIMES A DAY
Qty: 0 | Refills: 0 | Status: DISCONTINUED | OUTPATIENT
Start: 2017-08-16 | End: 2017-08-18

## 2017-08-16 RX ADMIN — Medication 0.2 MILLIGRAM(S): at 06:29

## 2017-08-16 RX ADMIN — Medication 0.2 MILLIGRAM(S): at 18:22

## 2017-08-16 RX ADMIN — Medication 30 MILLIGRAM(S): at 04:30

## 2017-08-16 RX ADMIN — Medication 30 MILLIGRAM(S): at 09:58

## 2017-08-16 RX ADMIN — SENNA PLUS 2 TABLET(S): 8.6 TABLET ORAL at 22:36

## 2017-08-16 RX ADMIN — Medication 600 MILLIGRAM(S): at 21:51

## 2017-08-16 RX ADMIN — OXYCODONE AND ACETAMINOPHEN 2 TABLET(S): 5; 325 TABLET ORAL at 23:51

## 2017-08-16 RX ADMIN — SIMETHICONE 80 MILLIGRAM(S): 80 TABLET, CHEWABLE ORAL at 11:58

## 2017-08-16 RX ADMIN — IRON SUCROSE 110 MILLIGRAM(S): 20 INJECTION, SOLUTION INTRAVENOUS at 11:57

## 2017-08-16 RX ADMIN — Medication 100 MILLIGRAM(S): at 22:36

## 2017-08-16 RX ADMIN — Medication 30 MILLIGRAM(S): at 16:16

## 2017-08-16 RX ADMIN — EMTRICITABINE, RILPIVIRINE HYDROCHLORIDE, AND TENOFOVIR DISOPROXIL FUMARATE 1 TABLET(S): 200; 25; 300 TABLET, FILM COATED ORAL at 22:35

## 2017-08-16 RX ADMIN — Medication 600 MILLIGRAM(S): at 21:17

## 2017-08-16 RX ADMIN — OXYCODONE AND ACETAMINOPHEN 2 TABLET(S): 5; 325 TABLET ORAL at 22:36

## 2017-08-16 RX ADMIN — ENOXAPARIN SODIUM 40 MILLIGRAM(S): 100 INJECTION SUBCUTANEOUS at 11:57

## 2017-08-16 RX ADMIN — MORPHINE SULFATE 4 MILLIGRAM(S): 50 CAPSULE, EXTENDED RELEASE ORAL at 06:28

## 2017-08-16 RX ADMIN — SIMETHICONE 80 MILLIGRAM(S): 80 TABLET, CHEWABLE ORAL at 21:04

## 2017-08-16 RX ADMIN — Medication 325 MILLIGRAM(S): at 08:23

## 2017-08-16 RX ADMIN — Medication 30 MILLIGRAM(S): at 03:34

## 2017-08-16 RX ADMIN — Medication 30 MILLIGRAM(S): at 10:15

## 2017-08-16 RX ADMIN — Medication 0.2 MILLIGRAM(S): at 11:57

## 2017-08-16 RX ADMIN — OXYCODONE AND ACETAMINOPHEN 2 TABLET(S): 5; 325 TABLET ORAL at 16:27

## 2017-08-16 NOTE — CHART NOTE - NSCHARTNOTEFT_GEN_A_CORE
delayed entry  patient assess with Dr. Pacheco,house attending at 11:15pm    OB PA Focused Note POD#0  PA called by nurse to patient's bedside to assess bleeding.  Patient seen and evaluated at bedside, offers no complaints. Pad and half of shani saturated with bright red blood. PCA stated she recently changed the pad. Fundus is firm. No clots evacuated from vaginal canal.                           7.7    12.4  )-----------( 265      ( 15 Aug 2017 19:09 )             24.7       rpt cbc as above  vital signs stable  plan to give methergine PO for 24hrs and cytotec 1000mcg rectal  rpt cbc in the AM  monitor vital signs and continue pad checks  d/w Dr. Olanescu delayed entry  patient assess with Dr. Pacheco,house attending at 11:15pm    OB PA Focused Note POD#0  PA called by nurse to patient's bedside to assess bleeding.  Patient seen and evaluated at bedside, offers no complaints. Pad and half of shani saturated with bright red blood. PCA stated she recently changed the pad. Fundus is firm. No clots evacuated from vaginal canal.                           7.7    12.4  )-----------( 265      ( 15 Aug 2017 19:09 )             24.7       rpt cbc as above  vital signs stable  plan to give methergine PO for 24hrs and cytotec 1000mcg rectal  rpt cbc in the AM  no blood transfusion needed at this time per Dr. Olanescu  monitor vital signs and continue pad checks  d/w Dr. Olanescu

## 2017-08-16 NOTE — DISCHARGE NOTE OB - PLAN OF CARE
follow up with own ob/gyn in 2  weeks for incisional check. no sex, pelvic rest, no heavy lifting iv iron then continue with oral iron 3xday wound check in 1-2weeks no sex nothing in vagina no heavy lifting no pushing no straining no strenuous activities  pain medication as needed; stool softener; dulcolax as needed if constipated  walk for exercise: helps recovery   continue prenatal vitamins daily especially whole course of breastfeeding  see your OB in the office for follow up post partum check call the office set up appointment in 1-2weeks  clean wound daily with soap and water; please note wound for redness or swelling; if noted go to the office right away so the doctor can evaluate the wound for possible wound infection see your specialist next week for further follow up after delivery

## 2017-08-16 NOTE — DISCHARGE NOTE OB - MATERIALS PROVIDED
Discharge Medication Information for Patients and Families Pocket Guide/Shaken Baby Prevention Handout/Letter of Medical Neccessity/[place]  Immunization Record/Breastfeeding Guide and Packet/Birth Certificate Instructions/Breastfeeding Mother’s Support Group Information/[facility]  Screening Program/Vaccinations/[facility] Hearing Screen Program/Back To Sleep Handout

## 2017-08-16 NOTE — DISCHARGE NOTE OB - MEDICATION SUMMARY - MEDICATIONS TO TAKE
I will START or STAY ON the medications listed below when I get home from the hospital:    ibuprofen 600 mg oral tablet  -- 1 tab(s) by mouth every 6 hours, As needed, Mild pain or headache  -- Indication: For for pain    acetaminophen-oxycodone 325 mg-5 mg oral tablet  -- 2 tab(s) by mouth every 6 hours, As needed, Severe Pain (7 - 10) MDD:8  -- Indication: For for severe pain    emtricitabine/rilpivirine/tenofovir disoproxil 200 mg-25 mg-300 mg oral tablet  -- 1 tab(s) by mouth once a day (before a meal)  -- Indication: For for maintenance    ferrous sulfate 325 mg oral tablet  -- 1 tab(s) by mouth 3 times a day  -- Check with your doctor before becoming pregnant.  Do not chew, break, or crush.  May discolor urine or feces.    -- Indication: For Acute blood loss anemia    Prenatal Multivitamins oral tablet  --  by mouth   -- Indication: For Continue daily    Colace sodium 100 mg oral capsule  -- 1 cap(s) by mouth 2 times a day  -- Medication should be taken with plenty of water.    -- Indication: For stool softner    Dulcolax Laxative 5 mg oral delayed release tablet  -- 1 tab(s) by mouth once a day, As Needed - for constipation  -- Swallow whole.  Do not crush.    -- Indication: For take if constipated    senna oral tablet  -- 2 tab(s) by mouth once a day (at bedtime)  -- Indication: For take at bedtime to help with bowel movement

## 2017-08-16 NOTE — DISCHARGE NOTE OB - CARE PLAN
Principal Discharge DX:	 delivery delivered  Goal:	follow up with own ob/gyn in 2  weeks for incisional check. no sex, pelvic rest, no heavy lifting  Instructions for follow-up, activity and diet:	follow up with own ob/gyn in 2  weeks for incisional check. no sex, pelvic rest, no heavy lifting Principal Discharge DX:	 delivery delivered  Goal:	wound check in 1-2weeks  Instructions for follow-up, activity and diet:	no sex nothing in vagina no heavy lifting no pushing no straining no strenuous activities  pain medication as needed; stool softener; dulcolax as needed if constipated  walk for exercise: helps recovery   continue prenatal vitamins daily especially whole course of breastfeeding  see your OB in the office for follow up post partum check call the office set up appointment in 1-2weeks  clean wound daily with soap and water; please note wound for redness or swelling; if noted go to the office right away so the doctor can evaluate the wound for possible wound infection  Secondary Diagnosis:	Acute blood loss anemia  Goal:	iv iron then continue with oral iron 3xday Principal Discharge DX:	 delivery delivered  Goal:	wound check in 1-2weeks  Instructions for follow-up, activity and diet:	no sex nothing in vagina no heavy lifting no pushing no straining no strenuous activities  pain medication as needed; stool softener; dulcolax as needed if constipated  walk for exercise: helps recovery   continue prenatal vitamins daily especially whole course of breastfeeding  see your OB in the office for follow up post partum check call the office set up appointment in 1-2weeks  clean wound daily with soap and water; please note wound for redness or swelling; if noted go to the office right away so the doctor can evaluate the wound for possible wound infection  Secondary Diagnosis:	Acute blood loss anemia  Goal:	iv iron then continue with oral iron 3xday  Secondary Diagnosis:	HIV (human immunodeficiency virus infection)  Goal:	see your specialist next week for further follow up after delivery

## 2017-08-16 NOTE — PROGRESS NOTE ADULT - PROBLEM SELECTOR PLAN 3
PA NOTE:  POD#1 rpt csection   HIV + undetectable viral load last test 8/4/17   on HIV meds regimen  delayed PPH noted in PACU pt now on methergine x24hrs  s/p [place] cytotec 1000mcg  -rpt cbc in am  -pt made aware of cardio-sx of acute blood loss anemia  -at this time pt wants conservative management of the asymptomatic anemia  -pain meds prn  -dvt ppx  -ambulation PA NOTE:  POD#1 rpt csection   HIV + undetectable viral load last test 8/4/17   on HIV meds regimen  delayed PPH noted in PACU pt now on methergine x24hrs  s/p [place] cytotec 1000mcg  -rpt cbc in am  -pt made aware of cardio-sx of acute blood loss anemia  -at this time pt wants conservative management of the asymptomatic anemia  -pain meds prn  -dvt ppx  -ambulation  -anticipate dc date pod#3 8/18/17

## 2017-08-16 NOTE — PROGRESS NOTE ADULT - PROBLEM SELECTOR PLAN 2
PA NOTE:  POD#1 rpt csection   HIV + undetectable viral load last test 8/4/17   on HIV meds regimen  delayed PPH noted in PACU pt now on methergine x24hrs  s/p [place] cytotec 1000mcg  -rpt cbc in am  -pt made aware of cardio-sx of acute blood loss anemia  -at this time pt wants conservative management of the asymptomatic anemia  -pain meds prn  -dvt ppx  -ambulation PA NOTE:  POD#1 rpt csection   HIV + undetectable viral load last test 8/4/17   on HIV meds regimen  delayed PPH noted in PACU pt now on methergine x24hrs  s/p [place] cytotec 1000mcg  -rpt cbc in am  -pt made aware of cardio-sx of acute blood loss anemia  -at this time pt wants conservative management of the asymptomatic anemia  -pain meds prn  -dvt ppx  -ambulation  -anticipate dc date pod#3 8/18/17

## 2017-08-16 NOTE — DISCHARGE NOTE OB - PATIENT PORTAL LINK FT
“You can access the Follow[employer] Patient Portal, offered by [facility], by registering with the following website: [email or web address]”

## 2017-08-16 NOTE — DISCHARGE NOTE OB - ADDITIONAL INSTRUCTIONS
follow up with own ob/gyn in 2  weeks. no sex, pelvic rest, no heavy lifting no sex nothing in vagina no heavy lifting no pushing no straining no strenuous activities  pain medication as needed; stool softener; dulcolax as needed if constipated  walk for exercise: helps recovery   continue prenatal vitamins daily especially whole course of breastfeeding  see your OB in the office for follow up post partum check call the office set up appointment in 1-2weeks  clean wound daily with soap and water; please note wound for redness or swelling; if noted go to the office right away so the doctor can evaluate the wound for possible wound infection

## 2017-08-16 NOTE — PROGRESS NOTE ADULT - SUBJECTIVE AND OBJECTIVE BOX
PA NOTE:  POD#1 rpt csection   HIV + undetectable viral load last test 8/4/17   on HIV meds regimen  delayed PPH noted in PACU pt now on methergine x24hrs  s/p CT cytotec 1000mcg    Patient seen at bedside resting comfortably offers no new complaints. + Ambulation, voiding without difficulty, no flatus yet; tolerating regular diet. both breastfeeding and bottle feeding. Denies HA, CP, SOB, N/V/D,  no bm; dizziness, palpitations, worsening abdominal pain, worsening vaginal bleeding, or any other concerns.                           7.7    10.4  )-----------( 268      ( 16 Aug 2017 07:03 )             24.0   pt orthostatic vitals negative  at this time pt wants conservative management iv venofer vitamin c/pnv daily    Vital Signs Last 24 Hrs  T(C): 36.6 (16 Aug 2017 11:24), Max: 37.1 (15 Aug 2017 12:52)  T(F): 97.8 (16 Aug 2017 11:24), Max: 98.8 (15 Aug 2017 12:52)  HR: 58 (16 Aug 2017 11:24) (53 - 71)  BP: 141/70 (16 Aug 2017 11:24) (110/55 - 146/62)  BP(mean): --  RR: 18 (16 Aug 2017 11:24) (12 - 18)  SpO2: 100% (16 Aug 2017 11:24) (100% - 100%)    Gen: A&O x 3, NAD  Chest: CTABL  Cardiac: S1+S2+ RRR  Breast: Soft, nontender, nonengorged  Abdomen: +BS; soft; Nontender, nondistended, dressing removed Incision C/D/I steri strips in place   Gyn: Minimal lochia  Extremities: Nontender, DTRS 2+, no worsening edema    CBC Full  -  ( 16 Aug 2017 07:03 )  WBC Count : 10.4 K/uL  Hemoglobin : 7.7 g/dL  Hematocrit : 24.0 %  Platelet Count - Automated : 268 K/uL  Mean Cell Volume : 81.1 fl  Mean Cell Hemoglobin : 26.0 pg  Mean Cell Hemoglobin Concentration : 32.1 gm/dL  Auto Neutrophil # : 7.5 K/uL  Auto Lymphocyte # : 2.1 K/uL  Auto Monocyte # : 0.6 K/uL  Auto Eosinophil # : 0.1 K/uL  Auto Basophil # : 0.1 K/uL  Auto Neutrophil % : 72.7 %  Auto Lymphocyte % : 20.2 %  Auto Monocyte % : 5.7 %  Auto Eosinophil % : 0.9 %  Auto Basophil % : 0.5 %    08-15    140  |  108  |  4<L>  ----------------------------<  95  3.9   |  25  |  0.62    Ca    8.5      15 Aug 2017 06:59    TPro  6.6  /  Alb  2.5<L>  /  TBili  0.6  /  DBili  x   /  AST  20  /  ALT  15  /  AlkPhos  166<H>  08-15    LIVER FUNCTIONS - ( 15 Aug 2017 06:59 )  Alb: 2.5 g/dL / Pro: 6.6 g/dL / ALK PHOS: 166 U/L / ALT: 15 U/L DA / AST: 20 U/L / GGT: x             PA NOTE:  POD#1 rpt csection   HIV + undetectable viral load last test 8/4/17   on HIV meds regimen  delayed PPH noted in PACU pt now on methergine x24hrs  s/p CT cytotec 1000mcg  -rpt cbc in am  -pt made aware of cardio-sx of acute blood loss anemia  -at this time pt wants conservative management of the asymptomatic anemia  -pain meds prn  -dvt ppx  -ambulation PA NOTE:  POD#1 rpt csection   HIV + undetectable viral load last test 8/4/17   on HIV meds regimen  delayed PPH noted in PACU pt now on methergine x24hrs  s/p [place] cytotec 1000mcg    Patient seen at bedside resting comfortably offers no new complaints. + Ambulation, voiding without difficulty, no flatus yet; tolerating regular diet. both breastfeeding and bottle feeding. Denies HA, CP, SOB, N/V/D,  no bm; dizziness, palpitations, worsening abdominal pain, worsening vaginal bleeding, or any other concerns.                           7.7    10.4  )-----------( 268      ( 16 Aug 2017 07:03 )             24.0   pt orthostatic vitals negative  at this time pt wants conservative management iv venofer vitamin c/pnv daily    Vital Signs Last 24 Hrs  T(C): 36.6 (16 Aug 2017 11:24), Max: 37.1 (15 Aug 2017 12:52)  T(F): 97.8 (16 Aug 2017 11:24), Max: 98.8 (15 Aug 2017 12:52)  HR: 58 (16 Aug 2017 11:24) (53 - 71)  BP: 141/70 (16 Aug 2017 11:24) (110/55 - 146/62)  BP(mean): --  RR: 18 (16 Aug 2017 11:24) (12 - 18)  SpO2: 100% (16 Aug 2017 11:24) (100% - 100%)    Gen: A&O x 3, NAD  Chest: CTABL  Cardiac: S1+S2+ RRR  Breast: Soft, nontender, nonengorged  Abdomen: +BS; soft; Nontender, nondistended, dressing removed Incision C/D/I steri strips in place   Gyn: Minimal lochia  Extremities: Nontender, DTRS 2+, no worsening edema    CBC Full  -  ( 16 Aug 2017 07:03 )  WBC Count : 10.4 K/uL  Hemoglobin : 7.7 g/dL  Hematocrit : 24.0 %  Platelet Count - Automated : 268 K/uL  Mean Cell Volume : 81.1 fl  Mean Cell Hemoglobin : 26.0 pg  Mean Cell Hemoglobin Concentration : 32.1 gm/dL  Auto Neutrophil # : 7.5 K/uL  Auto Lymphocyte # : 2.1 K/uL  Auto Monocyte # : 0.6 K/uL  Auto Eosinophil # : 0.1 K/uL  Auto Basophil # : 0.1 K/uL  Auto Neutrophil % : 72.7 %  Auto Lymphocyte % : 20.2 %  Auto Monocyte % : 5.7 %  Auto Eosinophil % : 0.9 %  Auto Basophil % : 0.5 %    08-15    140  |  108  |  4<L>  ----------------------------<  95  3.9   |  25  |  0.62    Ca    8.5      15 Aug 2017 06:59    TPro  6.6  /  Alb  2.5<L>  /  TBili  0.6  /  DBili  x   /  AST  20  /  ALT  15  /  AlkPhos  166<H>  08-15    LIVER FUNCTIONS - ( 15 Aug 2017 06:59 )  Alb: 2.5 g/dL / Pro: 6.6 g/dL / ALK PHOS: 166 U/L / ALT: 15 U/L DA / AST: 20 U/L / GGT: x             PA NOTE:  POD#1 rpt csection   HIV + undetectable viral load last test 8/4/17   on HIV meds regimen  delayed PPH noted in PACU pt now on methergine x24hrs  s/p [place] cytotec 1000mcg  -rpt cbc in am  -pt made aware of cardio-sx of acute blood loss anemia  -at this time pt wants conservative management of the asymptomatic anemia orthostatic negative  -pain meds prn  -dvt ppx  -ambulation PA NOTE:  POD#1 rpt csection   HIV + undetectable viral load last test 8/4/17   on HIV meds regimen  delayed PPH noted in PACU pt now on methergine x24hrs  s/p [place] cytotec 1000mcg    Patient seen at bedside resting comfortably offers no new complaints. + Ambulation, voiding without difficulty, no flatus yet; tolerating clear liquid diet.  bottle feeding. Denies HA, CP, SOB, N/V/D,  no bm; dizziness, palpitations, worsening abdominal pain, worsening vaginal bleeding, or any other concerns.                           7.7    10.4  )-----------( 268      ( 16 Aug 2017 07:03 )             24.0   pt orthostatic vitals negative  at this time pt wants conservative management iv venofer vitamin c/pnv daily    Vital Signs Last 24 Hrs  T(C): 36.6 (16 Aug 2017 11:24), Max: 37.1 (15 Aug 2017 12:52)  T(F): 97.8 (16 Aug 2017 11:24), Max: 98.8 (15 Aug 2017 12:52)  HR: 58 (16 Aug 2017 11:24) (53 - 71)  BP: 141/70 (16 Aug 2017 11:24) (110/55 - 146/62)  BP(mean): --  RR: 18 (16 Aug 2017 11:24) (12 - 18)  SpO2: 100% (16 Aug 2017 11:24) (100% - 100%)    Gen: A&O x 3, NAD  Chest: CTABL  Cardiac: S1+S2+ RRR  Breast: Soft, nontender, nonengorged  Abdomen: +BS; soft; Nontender, nondistended, dressing removed Incision C/D/I steri strips in place   Gyn: Minimal lochia  Extremities: Nontender, DTRS 2+, no worsening edema    CBC Full  -  ( 16 Aug 2017 07:03 )  WBC Count : 10.4 K/uL  Hemoglobin : 7.7 g/dL  Hematocrit : 24.0 %  Platelet Count - Automated : 268 K/uL  Mean Cell Volume : 81.1 fl  Mean Cell Hemoglobin : 26.0 pg  Mean Cell Hemoglobin Concentration : 32.1 gm/dL  Auto Neutrophil # : 7.5 K/uL  Auto Lymphocyte # : 2.1 K/uL  Auto Monocyte # : 0.6 K/uL  Auto Eosinophil # : 0.1 K/uL  Auto Basophil # : 0.1 K/uL  Auto Neutrophil % : 72.7 %  Auto Lymphocyte % : 20.2 %  Auto Monocyte % : 5.7 %  Auto Eosinophil % : 0.9 %  Auto Basophil % : 0.5 %    08-15    140  |  108  |  4<L>  ----------------------------<  95  3.9   |  25  |  0.62    Ca    8.5      15 Aug 2017 06:59    TPro  6.6  /  Alb  2.5<L>  /  TBili  0.6  /  DBili  x   /  AST  20  /  ALT  15  /  AlkPhos  166<H>  08-15    LIVER FUNCTIONS - ( 15 Aug 2017 06:59 )  Alb: 2.5 g/dL / Pro: 6.6 g/dL / ALK PHOS: 166 U/L / ALT: 15 U/L DA / AST: 20 U/L / GGT: x             PA NOTE:  POD#1 rpt csection   HIV + undetectable viral load last test 8/4/17   on HIV meds regimen  delayed PPH noted in PACU pt now on methergine x24hrs  s/p [place] cytotec 1000mcg  -rpt cbc in am  -pt made aware of cardio-sx of acute blood loss anemia  -at this time pt wants conservative management of the asymptomatic anemia orthostatic negative  -pain meds prn  -dvt ppx  -ambulation

## 2017-08-17 LAB
BASOPHILS # BLD AUTO: 0.1 K/UL — SIGNIFICANT CHANGE UP (ref 0–0.2)
BASOPHILS NFR BLD AUTO: 0.5 % — SIGNIFICANT CHANGE UP (ref 0–2)
EOSINOPHIL # BLD AUTO: 0.2 K/UL — SIGNIFICANT CHANGE UP (ref 0–0.5)
EOSINOPHIL NFR BLD AUTO: 2 % — SIGNIFICANT CHANGE UP (ref 0–6)
HCT VFR BLD CALC: 23.5 % — LOW (ref 34.5–45)
HGB BLD-MCNC: 7.4 G/DL — LOW (ref 11.5–15.5)
LYMPHOCYTES # BLD AUTO: 2.4 K/UL — SIGNIFICANT CHANGE UP (ref 1–3.3)
LYMPHOCYTES # BLD AUTO: 24.1 % — SIGNIFICANT CHANGE UP (ref 13–44)
MCHC RBC-ENTMCNC: 25.8 PG — LOW (ref 27–34)
MCHC RBC-ENTMCNC: 31.6 GM/DL — LOW (ref 32–36)
MCV RBC AUTO: 81.5 FL — SIGNIFICANT CHANGE UP (ref 80–100)
MONOCYTES # BLD AUTO: 0.6 K/UL — SIGNIFICANT CHANGE UP (ref 0–0.9)
MONOCYTES NFR BLD AUTO: 6.5 % — SIGNIFICANT CHANGE UP (ref 2–14)
NEUTROPHILS # BLD AUTO: 6.6 K/UL — SIGNIFICANT CHANGE UP (ref 1.8–7.4)
NEUTROPHILS NFR BLD AUTO: 66.9 % — SIGNIFICANT CHANGE UP (ref 43–77)
PLATELET # BLD AUTO: 296 K/UL — SIGNIFICANT CHANGE UP (ref 150–400)
RBC # BLD: 2.88 M/UL — LOW (ref 3.8–5.2)
RBC # FLD: 15.1 % — HIGH (ref 10.3–14.5)
WBC # BLD: 9.9 K/UL — SIGNIFICANT CHANGE UP (ref 3.8–10.5)
WBC # FLD AUTO: 9.9 K/UL — SIGNIFICANT CHANGE UP (ref 3.8–10.5)

## 2017-08-17 RX ADMIN — SIMETHICONE 80 MILLIGRAM(S): 80 TABLET, CHEWABLE ORAL at 10:37

## 2017-08-17 RX ADMIN — ENOXAPARIN SODIUM 40 MILLIGRAM(S): 100 INJECTION SUBCUTANEOUS at 12:49

## 2017-08-17 RX ADMIN — EMTRICITABINE, RILPIVIRINE HYDROCHLORIDE, AND TENOFOVIR DISOPROXIL FUMARATE 1 TABLET(S): 200; 25; 300 TABLET, FILM COATED ORAL at 23:21

## 2017-08-17 RX ADMIN — OXYCODONE AND ACETAMINOPHEN 2 TABLET(S): 5; 325 TABLET ORAL at 10:42

## 2017-08-17 RX ADMIN — IRON SUCROSE 110 MILLIGRAM(S): 20 INJECTION, SOLUTION INTRAVENOUS at 12:49

## 2017-08-17 RX ADMIN — Medication 100 MILLIGRAM(S): at 06:43

## 2017-08-17 RX ADMIN — OXYCODONE AND ACETAMINOPHEN 2 TABLET(S): 5; 325 TABLET ORAL at 05:00

## 2017-08-17 RX ADMIN — OXYCODONE AND ACETAMINOPHEN 2 TABLET(S): 5; 325 TABLET ORAL at 11:42

## 2017-08-17 RX ADMIN — OXYCODONE AND ACETAMINOPHEN 2 TABLET(S): 5; 325 TABLET ORAL at 04:21

## 2017-08-17 RX ADMIN — OXYCODONE AND ACETAMINOPHEN 2 TABLET(S): 5; 325 TABLET ORAL at 19:21

## 2017-08-17 RX ADMIN — OXYCODONE AND ACETAMINOPHEN 2 TABLET(S): 5; 325 TABLET ORAL at 20:00

## 2017-08-17 RX ADMIN — Medication 100 MILLIGRAM(S): at 16:45

## 2017-08-17 RX ADMIN — SIMETHICONE 80 MILLIGRAM(S): 80 TABLET, CHEWABLE ORAL at 00:54

## 2017-08-17 NOTE — PROGRESS NOTE ADULT - SUBJECTIVE AND OBJECTIVE BOX
OB PA Progress Note POD#2    Patient seen at bedside resting comfortably offers no new complaints. + Ambulation, + void without difficulty, + flatus; tolerating regular diet. both breastfeeding and bottle feeding. Denies HA, CP, SOB, N/V/D,  no bm; dizziness, palpitations, worsening abdominal pain, worsening vaginal bleeding, or any other concerns.     Vital Signs Last 24 Hrs  T(C): 36.7 (17 Aug 2017 05:15), Max: 36.7 (16 Aug 2017 16:00)  T(F): 98 (17 Aug 2017 05:15), Max: 98.1 (16 Aug 2017 16:00)  HR: 63 (17 Aug 2017 05:15) (58 - 70)  BP: 125/72 (17 Aug 2017 05:15) (125/72 - 141/70)  BP(mean): 77 (16 Aug 2017 16:00) (77 - 77)  RR: 17 (17 Aug 2017 05:15) (17 - 18)  SpO2: 100% (17 Aug 2017 05:15) (100% - 100%)    Gen: A&O x 3, NAD  Chest: CTABL  Cardiac: S1+S2+ RRR  Breast: Soft, nontender, nonengorged  Abdomen: +BS; soft; Nontender, nondistended, dressing removed Incision C/D/I steri strips in place   Gyn: Minimal lochia  Extremities: Nontender, DTRS 2+, no worsening edema                        7.4    9.9   )-----------( 296      ( 17 Aug 2017 06:40 )             23.5       A/P: 28 y/o POD #2 s/p sched rpt c/s@38 weeks 5 days for HIV undectable viral load, stable     -Pain management as needed  -cont post op care  -OOB and ambulate  -DVT prophylaxis  -encourage incentive spirometer use  -Encourage breastfeeding   -d/w Dr. Olanescu

## 2017-08-17 NOTE — PROGRESS NOTE ADULT - PROBLEM SELECTOR PLAN 1
A/P: 26 y/o POD #2 s/p sched rpt c/s@38 weeks 5 days for HIV undectable viral load, stable     -Pain management as needed  -cont post op care  -OOB and ambulate  -DVT prophylaxis  -encourage incentive spirometer use  -Encourage breastfeeding   -d/w Dr. Olanescu

## 2017-08-18 VITALS
RESPIRATION RATE: 16 BRPM | DIASTOLIC BLOOD PRESSURE: 60 MMHG | HEART RATE: 65 BPM | TEMPERATURE: 98 F | OXYGEN SATURATION: 100 % | SYSTOLIC BLOOD PRESSURE: 118 MMHG

## 2017-08-18 PROCEDURE — 85610 PROTHROMBIN TIME: CPT

## 2017-08-18 PROCEDURE — 86780 TREPONEMA PALLIDUM: CPT

## 2017-08-18 PROCEDURE — 83615 LACTATE (LD) (LDH) ENZYME: CPT

## 2017-08-18 PROCEDURE — 84550 ASSAY OF BLOOD/URIC ACID: CPT

## 2017-08-18 PROCEDURE — 59050 FETAL MONITOR W/REPORT: CPT

## 2017-08-18 PROCEDURE — 86901 BLOOD TYPING SEROLOGIC RH(D): CPT

## 2017-08-18 PROCEDURE — 86850 RBC ANTIBODY SCREEN: CPT

## 2017-08-18 PROCEDURE — 86900 BLOOD TYPING SEROLOGIC ABO: CPT

## 2017-08-18 PROCEDURE — 90707 MMR VACCINE SC: CPT

## 2017-08-18 PROCEDURE — 85384 FIBRINOGEN ACTIVITY: CPT

## 2017-08-18 PROCEDURE — 88307 TISSUE EXAM BY PATHOLOGIST: CPT

## 2017-08-18 PROCEDURE — 90715 TDAP VACCINE 7 YRS/> IM: CPT

## 2017-08-18 PROCEDURE — 80053 COMPREHEN METABOLIC PANEL: CPT

## 2017-08-18 PROCEDURE — 85730 THROMBOPLASTIN TIME PARTIAL: CPT

## 2017-08-18 PROCEDURE — 86920 COMPATIBILITY TEST SPIN: CPT

## 2017-08-18 PROCEDURE — 85027 COMPLETE CBC AUTOMATED: CPT

## 2017-08-18 RX ORDER — OXYCODONE HYDROCHLORIDE 5 MG/1
2 TABLET ORAL
Qty: 56 | Refills: 0 | OUTPATIENT
Start: 2017-08-18 | End: 2017-08-25

## 2017-08-18 RX ORDER — FERROUS SULFATE 325(65) MG
1 TABLET ORAL
Qty: 90 | Refills: 3 | OUTPATIENT
Start: 2017-08-18 | End: 2017-12-15

## 2017-08-18 RX ORDER — TETANUS TOXOID, REDUCED DIPHTHERIA TOXOID AND ACELLULAR PERTUSSIS VACCINE, ADSORBED 5; 2.5; 8; 8; 2.5 [IU]/.5ML; [IU]/.5ML; UG/.5ML; UG/.5ML; UG/.5ML
0.5 SUSPENSION INTRAMUSCULAR ONCE
Qty: 0 | Refills: 0 | Status: DISCONTINUED | OUTPATIENT
Start: 2017-08-18 | End: 2017-08-18

## 2017-08-18 RX ORDER — IBUPROFEN 200 MG
1 TABLET ORAL
Qty: 40 | Refills: 2 | OUTPATIENT
Start: 2017-08-18 | End: 2017-09-16

## 2017-08-18 RX ORDER — DOCUSATE SODIUM 100 MG
1 CAPSULE ORAL
Qty: 60 | Refills: 0 | OUTPATIENT
Start: 2017-08-18 | End: 2017-09-17

## 2017-08-18 RX ORDER — SENNA PLUS 8.6 MG/1
2 TABLET ORAL
Qty: 60 | Refills: 0 | OUTPATIENT
Start: 2017-08-18 | End: 2017-09-17

## 2017-08-18 RX ORDER — TETANUS TOXOID, REDUCED DIPHTHERIA TOXOID AND ACELLULAR PERTUSSIS VACCINE, ADSORBED 5; 2.5; 8; 8; 2.5 [IU]/.5ML; [IU]/.5ML; UG/.5ML; UG/.5ML; UG/.5ML
0.5 SUSPENSION INTRAMUSCULAR ONCE
Qty: 0 | Refills: 0 | Status: COMPLETED | OUTPATIENT
Start: 2017-08-18 | End: 2017-08-18

## 2017-08-18 RX ORDER — EMTRICITABINE, RILPIVIRINE HYDROCHLORIDE, AND TENOFOVIR DISOPROXIL FUMARATE 200; 25; 300 MG/1; MG/1; MG/1
1 TABLET, FILM COATED ORAL
Qty: 0 | Refills: 0 | COMMUNITY
Start: 2017-08-18

## 2017-08-18 RX ADMIN — Medication 0.5 MILLILITER(S): at 06:40

## 2017-08-18 RX ADMIN — OXYCODONE AND ACETAMINOPHEN 2 TABLET(S): 5; 325 TABLET ORAL at 02:32

## 2017-08-18 RX ADMIN — OXYCODONE AND ACETAMINOPHEN 2 TABLET(S): 5; 325 TABLET ORAL at 03:30

## 2017-08-18 RX ADMIN — TETANUS TOXOID, REDUCED DIPHTHERIA TOXOID AND ACELLULAR PERTUSSIS VACCINE, ADSORBED 0.5 MILLILITER(S): 5; 2.5; 8; 8; 2.5 SUSPENSION INTRAMUSCULAR at 06:42

## 2017-08-18 RX ADMIN — Medication 100 MILLIGRAM(S): at 06:45

## 2017-08-18 RX ADMIN — OXYCODONE AND ACETAMINOPHEN 2 TABLET(S): 5; 325 TABLET ORAL at 08:53

## 2017-08-18 NOTE — PROGRESS NOTE ADULT - PROBLEM SELECTOR PLAN 3
a/p  pod#3 rpt  hiv + undetectable viral load  doing well post op  -dc  home today  -dw dr olanescu  -erx sent  -pain management to see pt and evaluate for out patient pain management  -ob check in 1week  -hiv specialist f/u next week; continue anti-retrovirals as instructed  -anemia: asymptomatic: iron 3xday/pnv/vitamin c

## 2017-08-18 NOTE — PROGRESS NOTE ADULT - SUBJECTIVE AND OBJECTIVE BOX
pod#3  pt doing well post op rpt    at bedside  pt has f/u with HIV specialist next week  ped f/u on 17  +flatus voids w/o problem  pt at this time offers no acute complaints  denies sob/cp/palpitations dizzy    Vital Signs Last 24 Hrs  T(C): 36.7 (18 Aug 2017 05:33), Max: 36.8 (17 Aug 2017 12:22)  T(F): 98.1 (18 Aug 2017 05:33), Max: 98.3 (17 Aug 2017 20:06)  HR: 65 (18 Aug 2017 05:33) (65 - 78)  BP: 118/60 (18 Aug 2017 05:33) (118/60 - 130/78)  BP(mean): --  RR: 16 (18 Aug 2017 05:33) (16 - 18)  SpO2: 100% (18 Aug 2017 05:33) (100% - 100%)    lungs b/l cta  heart rrr  abd inc site well healing +sterris in place no bleeding no erythema/edema noted; abd soft nd nt  pelvic lochia minimal  ext nonpitting edema no calf tenderness b/l                          7.4    9.9   )-----------( 296      ( 17 Aug 2017 06:40 )             23.5     pain management Isaiah was notified pt requesting narcotic for pain management post csection  pain management Isaiah will come and evaluate the patient    a/p  pod#3 rpt  hiv + undetectable viral load  doing well post op  -dc  home today  -dw dr olanescu  -erx sent  -pain management to see pt and evaluate for out patient pain management  -ob check in 1week  -hiv specialist f/u next week; continue anti-retrovirals as instructed  -anemia: asymptomatic: iron 3xday/pnv/vitamin c

## 2017-08-21 DIAGNOSIS — Z21 ASYMPTOMATIC HUMAN IMMUNODEFICIENCY VIRUS [HIV] INFECTION STATUS: ICD-10-CM

## 2017-08-21 DIAGNOSIS — D62 ACUTE POSTHEMORRHAGIC ANEMIA: ICD-10-CM

## 2017-08-21 DIAGNOSIS — Z3A.38 38 WEEKS GESTATION OF PREGNANCY: ICD-10-CM

## 2017-08-21 DIAGNOSIS — E66.9 OBESITY, UNSPECIFIED: ICD-10-CM

## 2017-08-21 DIAGNOSIS — O34.211 MATERNAL CARE FOR LOW TRANSVERSE SCAR FROM PREVIOUS CESAREAN DELIVERY: ICD-10-CM

## 2017-08-31 NOTE — PROGRESS NOTE ADULT - ASSESSMENT
normal A/P: 28 y/o POD #2 s/p sched rpt c/s@38 weeks 5 days for HIV undectable viral load, stable     -Pain management as needed  -cont post op care  -OOB and ambulate  -DVT prophylaxis  -encourage incentive spirometer use  -Encourage breastfeeding   -d/w Dr. Olanescu

## 2018-01-25 NOTE — ED PROVIDER NOTE - NS ED ATTENDING STATEMENT MOD
Sugey Polanco will complete a sensory activity progressing to a full sensory diet, with minimal physical assistance on 3 out of 4 trials. Lizzie Meredith will throw 10 bean bags towards a container, placed 2-3 ft away from patient, on 4 out of 4 trials. Goal not addressed. Duke Pap will propel prone on scooter board a distance of 6ft, with 100% accuracy on 4 out of 4 trials. Goal not addressed. Ryan Krueger will crawl, using hands and knees, completely through 6ft tunnel on 4 out of 4 trials. With minimal verbal prompts and physical assistance to place into quadruped position, Juan crawled on hands and knees throughou a 4 foot tunnel on 1/2 trials. Adal Parker will roll a medium sized gym ball towards target (bowling pins) 3-4 ft away from patient on 4 out of 4 trials. Goal not addressed.       EDUCATION  Education provided to patient/family/caregiver:    []Yes/New education    [x]Yes/Continued Review of prior education     __ Yes/Reviewed  exercises from previous session  __No  If yes Education Provided: Demonstration, verbal, tactile,gestural,Sycuan    Method of Education:     []Discussion     [x]Demonstration    [] Written     []Other  Evaluation of Patients Response to Education:         []Patient and or caregiver verbalized understanding  []Patient and or Caregiver Demonstrated without assistance   []Patient and or Caregiver Demonstrated with assistance  [x]Needs additional instruction to demonstrate understanding of education  ASSESSMENT  Patient tolerated todays treatmentc session:    [x] Good   []  Fair   []  Poor  Limitations/difficulties with treatment session due to:   []Pain     []Fatigue     []Other medical complications     []Other  Goal Assessment: [x] No Change    []Improved  Comments:  PLAN  [x]Continue with current plan of care  [][facility]  []IHold per patient request  [] Change Treatment plan: See Updated POC  [] Insurance hold  __ Other     TIME   Time Treatment session
Attending Only

## 2020-05-01 ENCOUNTER — RESULT REVIEW (OUTPATIENT)
Age: 30
End: 2020-05-01

## 2020-09-26 ENCOUNTER — EMERGENCY (EMERGENCY)
Facility: HOSPITAL | Age: 30
LOS: 0 days | Discharge: HOME | End: 2020-09-26
Attending: EMERGENCY MEDICINE | Admitting: EMERGENCY MEDICINE
Payer: MEDICAID

## 2020-09-26 VITALS
DIASTOLIC BLOOD PRESSURE: 66 MMHG | TEMPERATURE: 99 F | SYSTOLIC BLOOD PRESSURE: 144 MMHG | OXYGEN SATURATION: 100 % | HEART RATE: 72 BPM | RESPIRATION RATE: 17 BRPM | HEIGHT: 67 IN

## 2020-09-26 DIAGNOSIS — R10.84 GENERALIZED ABDOMINAL PAIN: ICD-10-CM

## 2020-09-26 DIAGNOSIS — R11.2 NAUSEA WITH VOMITING, UNSPECIFIED: ICD-10-CM

## 2020-09-26 DIAGNOSIS — Z21 ASYMPTOMATIC HUMAN IMMUNODEFICIENCY VIRUS [HIV] INFECTION STATUS: ICD-10-CM

## 2020-09-26 DIAGNOSIS — R53.83 OTHER FATIGUE: ICD-10-CM

## 2020-09-26 DIAGNOSIS — R63.4 ABNORMAL WEIGHT LOSS: ICD-10-CM

## 2020-09-26 LAB
ALBUMIN SERPL ELPH-MCNC: 4.4 G/DL — SIGNIFICANT CHANGE UP (ref 3.5–5.2)
ALP SERPL-CCNC: 50 U/L — SIGNIFICANT CHANGE UP (ref 30–115)
ALT FLD-CCNC: <5 U/L — SIGNIFICANT CHANGE UP (ref 0–41)
ANION GAP SERPL CALC-SCNC: 6 MMOL/L — LOW (ref 7–14)
AST SERPL-CCNC: 13 U/L — SIGNIFICANT CHANGE UP (ref 0–41)
BASOPHILS # BLD AUTO: 0.03 K/UL — SIGNIFICANT CHANGE UP (ref 0–0.2)
BASOPHILS NFR BLD AUTO: 0.7 % — SIGNIFICANT CHANGE UP (ref 0–1)
BILIRUB SERPL-MCNC: 0.6 MG/DL — SIGNIFICANT CHANGE UP (ref 0.2–1.2)
BUN SERPL-MCNC: 6 MG/DL — LOW (ref 10–20)
CALCIUM SERPL-MCNC: 9.5 MG/DL — SIGNIFICANT CHANGE UP (ref 8.5–10.1)
CHLORIDE SERPL-SCNC: 107 MMOL/L — SIGNIFICANT CHANGE UP (ref 98–110)
CO2 SERPL-SCNC: 26 MMOL/L — SIGNIFICANT CHANGE UP (ref 17–32)
CREAT SERPL-MCNC: 1 MG/DL — SIGNIFICANT CHANGE UP (ref 0.7–1.5)
EOSINOPHIL # BLD AUTO: 0.09 K/UL — SIGNIFICANT CHANGE UP (ref 0–0.7)
EOSINOPHIL NFR BLD AUTO: 2 % — SIGNIFICANT CHANGE UP (ref 0–8)
GLUCOSE SERPL-MCNC: 96 MG/DL — SIGNIFICANT CHANGE UP (ref 70–99)
HCG SERPL QL: NEGATIVE — SIGNIFICANT CHANGE UP
HCT VFR BLD CALC: 33.2 % — LOW (ref 37–47)
HGB BLD-MCNC: 10.5 G/DL — LOW (ref 12–16)
IMM GRANULOCYTES NFR BLD AUTO: 0.2 % — SIGNIFICANT CHANGE UP (ref 0.1–0.3)
LYMPHOCYTES # BLD AUTO: 1.46 K/UL — SIGNIFICANT CHANGE UP (ref 1.2–3.4)
LYMPHOCYTES # BLD AUTO: 32.6 % — SIGNIFICANT CHANGE UP (ref 20.5–51.1)
MCHC RBC-ENTMCNC: 24.8 PG — LOW (ref 27–31)
MCHC RBC-ENTMCNC: 31.6 G/DL — LOW (ref 32–37)
MCV RBC AUTO: 78.3 FL — LOW (ref 81–99)
MONOCYTES # BLD AUTO: 0.3 K/UL — SIGNIFICANT CHANGE UP (ref 0.1–0.6)
MONOCYTES NFR BLD AUTO: 6.7 % — SIGNIFICANT CHANGE UP (ref 1.7–9.3)
NEUTROPHILS # BLD AUTO: 2.59 K/UL — SIGNIFICANT CHANGE UP (ref 1.4–6.5)
NEUTROPHILS NFR BLD AUTO: 57.8 % — SIGNIFICANT CHANGE UP (ref 42.2–75.2)
NRBC # BLD: 0 /100 WBCS — SIGNIFICANT CHANGE UP (ref 0–0)
PLATELET # BLD AUTO: 333 K/UL — SIGNIFICANT CHANGE UP (ref 130–400)
POTASSIUM SERPL-MCNC: 5.1 MMOL/L — HIGH (ref 3.5–5)
POTASSIUM SERPL-SCNC: 5.1 MMOL/L — HIGH (ref 3.5–5)
PROT SERPL-MCNC: 6.6 G/DL — SIGNIFICANT CHANGE UP (ref 6–8)
RBC # BLD: 4.24 M/UL — SIGNIFICANT CHANGE UP (ref 4.2–5.4)
RBC # FLD: 17 % — HIGH (ref 11.5–14.5)
SODIUM SERPL-SCNC: 139 MMOL/L — SIGNIFICANT CHANGE UP (ref 135–146)
WBC # BLD: 4.48 K/UL — LOW (ref 4.8–10.8)
WBC # FLD AUTO: 4.48 K/UL — LOW (ref 4.8–10.8)

## 2020-09-26 PROCEDURE — 99285 EMERGENCY DEPT VISIT HI MDM: CPT

## 2020-09-26 PROCEDURE — 74177 CT ABD & PELVIS W/CONTRAST: CPT | Mod: 26

## 2020-09-26 RX ORDER — SODIUM CHLORIDE 9 MG/ML
1000 INJECTION, SOLUTION INTRAVENOUS ONCE
Refills: 0 | Status: COMPLETED | OUTPATIENT
Start: 2020-09-26 | End: 2020-09-26

## 2020-09-26 RX ORDER — ONDANSETRON 8 MG/1
4 TABLET, FILM COATED ORAL ONCE
Refills: 0 | Status: COMPLETED | OUTPATIENT
Start: 2020-09-26 | End: 2020-09-26

## 2020-09-26 RX ORDER — IOHEXOL 300 MG/ML
30 INJECTION, SOLUTION INTRAVENOUS ONCE
Refills: 0 | Status: COMPLETED | OUTPATIENT
Start: 2020-09-26 | End: 2020-09-26

## 2020-09-26 RX ADMIN — SODIUM CHLORIDE 1000 MILLILITER(S): 9 INJECTION, SOLUTION INTRAVENOUS at 10:21

## 2020-09-26 RX ADMIN — ONDANSETRON 4 MILLIGRAM(S): 8 TABLET, FILM COATED ORAL at 10:24

## 2020-09-26 RX ADMIN — IOHEXOL 30 MILLILITER(S): 300 INJECTION, SOLUTION INTRAVENOUS at 10:24

## 2020-09-26 NOTE — ED PROVIDER NOTE - NS ED ROS FT
Constitutional: (-) fever, (+) weight loss, (+) fatigue    Eyes/ENT: (-) blurry vision, (-) epistaxis  Cardiovascular: (-) chest pain, (-) syncope  Respiratory: (-) cough, (-) shortness of breath  Gastrointestinal: (+) vomiting, (-) diarrhea  Musculoskeletal: (-) neck pain, (-) back pain, (-) joint pain  Integumentary: (-) rash, (-) edema  Neurological: (-) headache, (-) altered mental status  Psychiatric: (-) hallucinations  Allergic/Immunologic: (-) pruritus

## 2020-09-26 NOTE — ED PROVIDER NOTE - PATIENT PORTAL LINK FT
You can access the FollowMyHealth Patient Portal offered by [facility] by registering at the following website: [email or web address]. By joining [facility]’s FollowMyHealth portal, you will also be able to view your health information using other applications (apps) compatible with our system.

## 2020-09-26 NOTE — ED ADULT TRIAGE NOTE - CHIEF COMPLAINT QUOTE
"I just moved out here I've been feeling weird the past few days and I think my medication (HIV medication) isn't working. so I wanted to do blood work immediately"

## 2020-09-26 NOTE — ED PROVIDER NOTE - NSFOLLOWUPCLINICS_GEN_ALL_ED_FT
[facility]  Infectious Disease  [address] [place] [address]  Phone: [phone]  Fax:   Follow Up Time: 1-3 Days

## 2020-09-26 NOTE — ED PROVIDER NOTE - PROGRESS NOTE DETAILS
Pt wanted to check her CD4 count and viral load. Explained that it is not something that we can check for in the ED. Pt understands. Will complete work up for her presenting symptoms.

## 2020-09-26 NOTE — ED PROVIDER NOTE - CLINICAL SUMMARY MEDICAL DECISION MAKING FREE TEXT BOX
Patient presented with generalized fatigue, weight loss, abdominal pain with N/V. otherwise afebrile, HD stable, abd non-tender. Obtained labs which were grossly unremarkable including no significant leukocytosis, anemia, signs of dehydration/ARTUR, transaminitis or significant electrolyte abnormalities. CT abd/pelvis negative for emergent pathologies to explain patient's symptoms. Patient felt much better after tx in ED, able to tolerate PO, ambulatory, serial abdominal exams benign. Will discharge home with outpatient follow up for continued HIV management. Patient agreeable with plan. Agrees to return to ED for any new or worsening symptoms.

## 2020-09-26 NOTE — ED PROVIDER NOTE - ATTENDING CONTRIBUTION TO CARE
30 year old female, pmhx as documented, unknown last CD4 and viral load (compliant with HIV regimen per patient), presenting with generalized fatigue, unintentional 20lb weight loss x 2 weeks and intermittent N/V. Also endorses a diffuse abdominal discomfort described as achy, non-radiating, worse with eating, relieved with rest, mild severity. Denies having this before. Otherwise denies fevers, headache, vision changes, weakness/numbness, confusion, URI symptoms, neck pain, chest pain, back pain, dyspnea, cough, palpitations, diarrhea, constipation, blood in stool/dark stools, urinary symptoms, vaginal bleeding/discharge, leg swelling, rash, recent travel or sick contacts.    Vital Signs: I have reviewed the initial vital signs.  Constitutional: NAD, well-nourished, appears stated age, no acute distress.  HEENT: Airway patent, moist MM, no erythema/swelling/deformity of oral structures. EOMI, PERRLA.  CV: regular rate, regular rhythm, well-perfused extremities, 2+ b/l DP and radial pulses equal.  Lungs: BCTA, no increased WOB.  ABD: NTND, no guarding or rebound, no pulsatile mass, no hernias.   MSK: Neck supple, nontender, nl ROM, no stepoff. Chest nontender. Back nontender in TLS spine or to b/l bony structures or flanks. Ext nontender, nl rom, no deformity.   INTEG: Skin warm, dry, no rash.  NEURO: A&Ox3, normal strength, nl sensation throughout, normal speech.   PSYCH: Calm, cooperative, normal affect and interaction.    Will obtain labs, CT abd/pelvis since patient HIV+ with unexplained weight loss and N/V, IVF, symptomatic control, re-eval.

## 2020-09-26 NOTE — ED PROVIDER NOTE - CARE PROVIDER_API CALL
Linsey Greer)  Internal Medicine  [address] [place] [address]  Phone: [phone]  Fax: [phone]  Follow Up Time: 1-3 Days

## 2020-09-26 NOTE — ED PROVIDER NOTE - OBJECTIVE STATEMENT
The pt is a 30y F w/ hx of HIV on Tivicay (dolutegravir) and Descovy (tenofovir, alafenamide, emtricitabine), last CD4 and viral load unknown, presenting with generalized fatigue, 20 lb weight loss over 2 weeks, intermittent N/V. Pt says in the past there was an instance where her HIV meds weren't working and she felt like this. Denies fever, headache, chest pain, SOB, cough, abdominal pain, diarrhea, dysuria/hematuria.

## 2021-01-24 ENCOUNTER — TRANSCRIPTION ENCOUNTER (OUTPATIENT)
Age: 31
End: 2021-01-24

## 2022-04-26 ENCOUNTER — NON-APPOINTMENT (OUTPATIENT)
Age: 32
End: 2022-04-26

## 2022-06-01 NOTE — PATIENT PROFILE OB - PATIENT REPRESENTATIVE: ( YOU CAN CHOOSE ANY PERSON THAT CAN ASSIST YOU WITH YOUR HEALTH CARE PREFERENCES, DOES NOT HAVE TO BE A SPOUSE, IMMEDIATE FAMILY OR SIGNIFICANT OTHER/PARTNER)
[facility] Physician Discharge Summary       [address] [place] [address], [address] [place] [address]  [phone]            Activity level: As tolerated     Dispo:  Home    Condition on discharge: Stable     Patient ID:  Naila Ahn  [id number]  40 y.o.  1977    Admit date: 5/27/2022    Discharge date and time:  6/1/2022  12:22 PM    Admission Diagnoses: Principal Problem:    Necrotizing soft tissue infection  Active Problems:    Back abscess    Abscess of left shoulder    Cellulitis of back    Pulmonary nodule    Atrial fibrillation, rapid ([address]    Poorly controlled type 2 diabetes mellitus ([address]    HTN (hypertension)    Hyperlipidemia  Resolved Problems:    * No resolved hospital problems. *      Discharge Diagnoses: Principal Problem:    Necrotizing soft tissue infection  Active Problems:    Back abscess    Abscess of left shoulder    Cellulitis of back    Pulmonary nodule    Atrial fibrillation, rapid ([address]    Poorly controlled type 2 diabetes mellitus (HCC)    HTN (hypertension)    Hyperlipidemia  Resolved Problems:    * No resolved hospital problems. *      Consults:  IP CONSULT TO GENERAL SURGERY  IP CONSULT TO INFECTIOUS DISEASES  IP CONSULT TO INFECTIOUS DISEASES  IP CONSULT TO ENDOCRINOLOGY  IP CONSULT TO CARDIOLOGY    Procedures: 5/29/2022 -incision and drainage with excisional debridement of the skin soft tissue and muscle (7 x 5 x 5 cm) of the her back. Hospital Course:   Patient Naila Delay is a 40 y.o. presented with Cellulitis and abscess of trunk [L03.319, L02.219]  Hypochloremia [E87.8]  Hyponatremia [E87.1]  Back abscess [L02.212]  Abscess of left shoulder [L02.414]  Diabetic ketoacidosis without coma associated with type 2 diabetes mellitus ([address] [E11.10] . Patient presented to the emergency room for a abscess of the left shoulder that started 4 days ago. It was lanced in urgent care on 5/26/2022.   He went for follow-up at his PCP who had him go to the emergency room for evaluation. Patient was found to have tachycardia, leukocytosis and lactic acidosis. Admission was advised. He was given a dose of vancomycin in the ED. Consult with infectious disease and general surgery was obtained. His EKG had evidence of SVT. Due to his comorbidities and the need for I&D cardiology consult was obtained. Patient has diabetes and his sugars have not been well controlled. Her recent A1c was 11.4%. Endocrinology was consulted as well. General surgery evaluated the patient and tentatively scheduled him for OR for a complete cleanout 29th of  May 2022. Infectious disease recommended Zyvox and cefazolin. Patient has history of MSSA. Endocrinology assisted with improvement of his blood sugars over 350 to less than 200. His cultures grew MSSA which was pansensitive. ID recommended  Zyvox. They felt he was ready for discharge with local wound care. Patient developed SVT/atrial fibrillation and required Cardizem drip, amiodarone and digoxin. He did have a heart rate less than 100. Cardiology was okay for him to be discharged on Eliquis and beta-blocker. It was recommended that he follow-up with cardiology once recovered from his acute illness for an ischemic evaluation and echocardiogram.  An outpatient sleep study was also recommended. Patient was stable for discharge. He is to follow-up with his PCP in 1 week. Follow-up with general surgery and cardiology per their recommendations. He is to remain on the Zyvox for 13 days.     Discharge Exam:    General Appearance: alert and oriented to person, place and time and in no acute distress  Skin: warm and dry  Head: normocephalic and atraumatic  Eyes: pupils equal, round, and reactive to light, extraocular eye movements intact, conjunctivae normal  Neck: neck supple and non tender without mass   Pulmonary/Chest: clear to auscultation bilaterally- no wheezes, rales or rhonchi, normal air movement, no respiratory distress  Cardiovascular: normal rate, normal S1 and S2 and no carotid bruits  Abdomen: soft, non-tender, non-distended, normal bowel sounds, no masses or organomegaly  Extremities: no cyanosis, no clubbing and no edema  Neurologic: no cranial nerve deficit and speech normal    LABS:  Recent Labs     05/30/22  0545 05/31/22  0236 06/01/22  0300    133 139   K 3.6 3.6 4.1   CL 98 96* 100   CO2 24 27 29   BUN 6 6 5*   CREATININE 0.5* 0.7 0.6*   GLUCOSE 229* 383* 179*   CALCIUM 8.3* 7.9* 8.4*       Recent Labs     05/30/22  0545 05/31/22  0236 06/01/22  0300   WBC 14.2* 9.8 9.1   RBC 4.28 4.40 4.39   HGB 12.3* 12.6 12.6   HCT 39.3 39.1 39.2   MCV 91.8 88.9 89.3   MCH 28.7 28.6 28.7   MCHC 31.3* 32.2 32.1   RDW 13.2 13.1 13.0    373 471*   MPV 11.2 10.3 9.9     Imaging:  CT CHEST W CONTRAST    Result Date: 5/27/2022  EXAMINATION: CT OF THE CHEST WITH CONTRAST 5/27/2022 3:49 pm TECHNIQUE: CT of the chest was performed with the administration of intravenous contrast. Multiplanar reformatted images are provided for review. Automated exposure control, iterative reconstruction, and/or weight based adjustment of the mA/kV was utilized to reduce the radiation dose to as low as reasonably achievable. COMPARISON: None. HISTORY: ORDERING SYSTEM PROVIDED HISTORY: abscess back TECHNOLOGIST PROVIDED HISTORY: Reason for exam:->abscess back Decision Support Exception - unselect if not a suspected or confirmed emergency medical condition->Emergency Medical Condition (MA) FINDINGS: Mediastinum:  Thyroid is grossly unremarkable within the limits of CT. The central airways are clear. No evidence of mediastinal, hilar or axillary lymphadenopathy. Heart and pericardium are unremarkable. Lungs/pleura: A tiny 2 mm nodule seen in the superior aspect of the right middle lobe adjacent to the minor fissure. This might represent an intrapulmonary lymph node.   Otherwise, lungs are clear without focal consolidation or pulmonary edema. No evidence of pleural effusion or pneumothorax. Upper Abdomen:  Limited images of the upper abdomen demonstrate no acute abnormality. Mild perinephric stranding is seen bilaterally there is nonspecific but most likely chronic. Soft Tissues/Bones: There is extensive posterior subcutaneous soft tissue density/fat stranding over the left upper back extending from the lower cervical region down to about T6, consistent with cellulitis. No focal fluid collection is seen to suggest a drainable abscess. There is associated skin thickening. 1. Findings consistent with extensive cellulitis involving the subcutaneous tissues in the left upper back. No drainable abscess seen. 2. No evidence of acute intrathoracic process. 3. 2 mm nodule in the superior aspect of the right middle lobe. If the patient is low risk, no routine follow-up is indicated. Please see below for Fleischner society follow-up guidelines. RECOMMENDATIONS: Fleischner Society guidelines for follow-up and management of incidentally detected pulmonary nodules: Single Solid Nodule: Nodule size less than 6 mm In a low-risk patient, no routine follow-up. In a high-risk patient, optional CT at 12 months. Nodule size equals 6-8 mm In a low-risk patient, CT at 6-12 months, then consider CT at 18-24 months. In a high-risk patient, CT at 6-12 months, then CT at 18-24 months. Nodule size greater than 8 mm In a low-risk patient, consider CT at 3 months, PET/CT, or tissue sampling. In a high-risk patient, consider CT at 3 months, PET/CT, or tissue sampling. Multiple Solid Nodules: Nodule size less than 6 mm In a low-risk patient, no routine follow-up. In a high-risk patient, optional CT at 12 months. Nodule size equals 6-8 mm In a low-risk patient, CT at 3-6 months, then consider CT at 18-24 months. In a high-risk patient, CT at 3-6 months, then CT at 18-24 months.  Nodule size greater than 8 mm In a low-risk patient, CT at 3-6 months, then consider CT at 18-24 months. In a high-risk patient, CT at 3-6 months, then CT at 18-24 months. - Low risk patients include individuals with minimal or absent history of smoking and other known risk factors. - High risk patients include individuals with a history or smoking or known risk factors. Radiology 2017 http://pubs. rsna.org/doi/full/10.1148/radiol. 4930808016       Patient Instructions:      Medication List      START taking these medications    apixaban 5 MG Tabs tablet  Commonly known as: ELIQUIS  Take 1 tablet by mouth 2 times daily     collagenase 250 UNIT/GM ointment  Apply topically daily. insulin glargine 100 UNIT/ML injection vial  Commonly known as: LANTUS  Inject 50 Units into the skin every morning  Start taking on: June 2, 2022  Replaces: insulin detemir 100 UNIT/ML injection pen     insulin lispro 100 UNIT/ML Soln injection vial  Commonly known as: HUMALOG  Inject 18 Units into the skin 3 times daily (with meals) Use the following sliding scale in addition to meal time insulin:  <139 - 0 units; 140 - 199, 2 units; 200 - 249, 3 units; 250 -299, 5 units; 300 -349, 6 units; 350 - 400, 7 units; >400, 9 units. linezolid 600 MG tablet  Commonly known as: Zyvox  Take 1 tablet by mouth 2 times daily for 13 days     metFORMIN 1000 MG tablet  Commonly known as: GLUCOPHAGE  Take 1 tablet by mouth 2 times daily (with meals)     metoprolol tartrate 25 MG tablet  Commonly known as: LOPRESSOR  Take 1 tablet by mouth 2 times daily     mupirocin 2 % ointment  Commonly known as: BACTROBAN  Apply topically 3 times daily. oxyCODONE 5 MG immediate release tablet  Commonly known as: ROXICODONE  Take 1 tablet by mouth every 6 hours as needed for Pain for up to 5 days.      polyethylene glycol 17 g packet  Commonly known as: GLYCOLAX  Take 17 g by mouth daily as needed for Constipation        CHANGE how you take these medications    allopurinol 300 MG tablet  Commonly known as: ZYLOPRIM  TAKE 1 TABLET BY MOUTH EVERY DAY  What changed: Another medication with the same name was removed. Continue taking this medication, and follow the directions you see here. * BD ULTRA-FINE PEN NEEDLES 29G X 12.7MM Misc  Generic drug: Insulin Pen Needle  USE 5 TIMES DAILY OR AS DIRECTED  What changed: Another medication with the same name was added. Make sure you understand how and when to take each. * Insulin Pen Needle 32G X 4 MM Misc  1 each by Does not apply route daily  What changed: You were already taking a medication with the same name, and this prescription was added. Make sure you understand how and when to take each. rosuvastatin 20 MG tablet  Commonly known as: CRESTOR  TAKE 1 TABLET BY MOUTH EVERYDAY AT BEDTIME  What changed: Another medication with the same name was removed. Continue taking this medication, and follow the directions you see here. * This list has 2 medication(s) that are the same as other medications prescribed for you. Read the directions carefully, and ask your doctor or other care provider to review them with you.             CONTINUE taking these medications    aspirin 81 MG tablet     blood glucose test strips  Test four times a day     escitalopram 10 MG tablet  Commonly known as: LEXAPRO  TAKE 1 TABLET BY MOUTH EVERY DAY     Farxiga 10 MG tablet  Generic drug: dapagliflozin  TAKE 1 TABLET BY MOUTH EVERY DAY IN THE MORNING     lisinopril 20 MG tablet  Commonly known as: PRINIVIL;ZESTRIL  TAKE 1 TABLET BY MOUTH EVERY DAY     omega-3 acid ethyl esters 1 g capsule  Commonly known as: LOVAZA  TAKE 2 CAPSULES BY MOUTH TWICE A DAY     ONE TOUCH II TEST STRIP RESENDEZ        STOP taking these medications    ibuprofen 600 MG tablet  Commonly known as: IBU     insulin aspart 100 UNIT/ML injection pen  Commonly known as: NovoLOG FlexPen     insulin detemir 100 UNIT/ML injection pen  Commonly known as: Levemir FlexTouch  Replaced by: insulin glargine 100 UNIT/ML injection vial     Janumet  MG per tablet  Generic drug: sitaGLIPtan-metFORMIN     NovoLOG FlexPen 100 UNIT/ML injection pen  Generic drug: insulin aspart     Semaglutide 3 MG Tabs     Semaglutide 7 MG Tabs           Where to Get Your Medications      You can get these medications from any pharmacy    Bring a paper prescription for each of these medications  · apixaban 5 MG Tabs tablet  · collagenase 250 UNIT/GM ointment  · insulin glargine 100 UNIT/ML injection vial  · insulin lispro 100 UNIT/ML Soln injection vial  · Insulin Pen Needle 32G X 4 MM Misc  · linezolid 600 MG tablet  · metFORMIN 1000 MG tablet  · metoprolol tartrate 25 MG tablet  · mupirocin 2 % ointment  · oxyCODONE 5 MG immediate release tablet  You don't need a prescription for these medications  · polyethylene glycol 17 g packet       Total discharge time:  35 minutes    Note that more than 30 minutes was spent in preparing discharge papers, discussing discharge with patient, medication review, etc.    Signed:  Electronically signed by Lizabeth Antonio MD on 6/1/2022 at 12:22 PM Yes

## 2022-06-19 ENCOUNTER — EMERGENCY (EMERGENCY)
Facility: HOSPITAL | Age: 32
LOS: 0 days | Discharge: HOME | End: 2022-06-20
Attending: EMERGENCY MEDICINE | Admitting: EMERGENCY MEDICINE

## 2022-06-19 VITALS
WEIGHT: 190.04 LBS | RESPIRATION RATE: 18 BRPM | HEIGHT: 67 IN | SYSTOLIC BLOOD PRESSURE: 114 MMHG | HEART RATE: 68 BPM | TEMPERATURE: 98 F | DIASTOLIC BLOOD PRESSURE: 66 MMHG | OXYGEN SATURATION: 99 %

## 2022-06-19 DIAGNOSIS — M25.552 PAIN IN LEFT HIP: ICD-10-CM
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
## 2022-06-19 PROCEDURE — 99284 EMERGENCY DEPT VISIT MOD MDM: CPT

## 2022-06-19 RX ORDER — KETOROLAC TROMETHAMINE 30 MG/ML
30 SYRINGE (ML) INJECTION ONCE
Refills: 0 | Status: DISCONTINUED | OUTPATIENT
Start: 2022-06-19 | End: 2022-06-19

## 2022-06-19 RX ADMIN — Medication 30 MILLIGRAM(S): at 23:54

## 2022-06-19 NOTE — ED ADULT TRIAGE NOTE - CHIEF COMPLAINT QUOTE
I was taking a shower, I'm on disability, I have grab bars, I went to hold on and I slipped and I fell, hitting my bob on the side of the tub, my back hurts. I usually take Percocet twice a day for my back, but I didn't think that it would be enough for my pain so I came here to see if I could get something stronger  - patient

## 2022-06-20 PROBLEM — B20 HUMAN IMMUNODEFICIENCY VIRUS [HIV] DISEASE: Chronic | Status: ACTIVE | Noted: 2020-09-27

## 2022-06-20 NOTE — ED PROVIDER NOTE - PHYSICAL EXAMINATION
CONSTITUTIONAL: Well-developed; well-nourished; in no acute distress.   SKIN: warm, dry  HEAD: Normocephalic; atraumatic.  EYES: PERRL, EOMI, no conjunctival erythema  ENT: No nasal discharge; airway clear. mmm.  NECK: Supple; non tender.  CARD: S1, S2 normal; no murmurs, gallops, or rubs. Regular rate and rhythm.   RESP: No wheezes, rales or rhonchi.  ABD: soft ntnd  MSK/EXT: Normal ROM. No clubbing, cyanosis or edema. +mild ttp to L hip.  NEURO: Alert, oriented, grossly unremarkable. no fnd. normal motor/strength/sensation. ambulating with a steady gait.  PSYCH: Cooperative, appropriate.

## 2022-06-20 NOTE — ED ADULT NURSE NOTE - NSIMPLEMENTINTERV_GEN_ALL_ED
Implemented All Universal Safety Interventions:  [place] to call system. Call bell, personal items and telephone within reach. Instruct patient to call for assistance. Room bathroom lighting operational. Non-slip footwear when patient is off stretcher. Physically safe environment: no spills, clutter or unnecessary equipment. Stretcher in lowest position, wheels locked, appropriate side rails in place.

## 2022-06-20 NOTE — ED PROVIDER NOTE - OBJECTIVE STATEMENT
33 yo female with pmh of chronic back pain on percocet presents to ED after she slipped and fell in the shower and fell onto her L hip. She did not hit her head, no LOC. Patient did not take her percocet today but states she came to the ED for stronger pain medication. Patient ambulatory. No numbness or tingling.

## 2022-06-20 NOTE — ED PROVIDER NOTE - ATTENDING CONTRIBUTION TO CARE
31 yo F with pMH of chronic back pain on percocet presents to ED after she slipped and fell in the shower and fell onto her L hip. She did not hit her head, no LOC. Patient did not take her percocet today but came to the ED for stronger pain medication. Patient ambulatory. No numbness or tingling. No back pain.   Patient had her tubes tied     Constitutional: NAD  Eyes: PERRL, no conjunctival injection  HENT:  Neck supple without meningismus   CV: RRR, Warm, well-perfused extremities  RESP: CTA B/L, no tachypnea   GI: soft, non-tender, non-distended  MSK: No gross deformities appreciated, tenderness of palpation L hip  Skin: Warm, dry. No rashes  Neuro: Alert, CNs II-XII grossly intact. Sensation and motor function of extremities grossly intact. normal gait   Psych: Appropriate mood and affect.    xray, toradol

## 2022-06-20 NOTE — ED PROVIDER NOTE - PATIENT PORTAL LINK FT
You can access the FollowMyHealth Patient Portal offered by [facility] by registering at the following website: [email or web address]. By joining [facility]’s FollowMyHealth portal, you will also be able to view your health information using other applications (apps) compatible with our system.

## 2022-06-20 NOTE — ED PROVIDER NOTE - CLINICAL SUMMARY MEDICAL DECISION MAKING FREE TEXT BOX
31 yo F presented to ED sp slip and fall in the shower. Neurovascularly intact. Normal gait.   patient improved. dc home with strict return precautions

## 2023-02-25 ENCOUNTER — NON-APPOINTMENT (OUTPATIENT)
Age: 33
End: 2023-02-25

## 2025-01-07 NOTE — PROGRESS NOTE ADULT - PROBLEM SELECTOR PLAN 1
HPI:     Chief Complaint   Patient presents with    Rash     X 3 days     Diarrhea     X 3 days        At the start of the appointment, I reviewed the patient's [facility] Epic Chart (including Media scanned in from previous providers) for the active Problem List, all pertinent Past Medical Hx, medications, recent radiologic and laboratory findings.  In addition, I reviewed pt's documented Immunization Record and Encounter History.      Abida is a 3 m.o. female brought by mother for Rash (X 3 days ) and Diarrhea (X 3 days )       HPI:  History was provided by parent who reports looser stools and diaper rash for the past 4-5 days. Mother denies fever, fussiness, vomiting or any other associated symptoms. No sick contacts. Baby continues to be her normal self with good appetite and level of activity. Mother reports she took baby to hospital for evaluation a couple of days ago and was prescribed zinc oxide 40% however the rash is worsening.     Comprehensive ROS negative except those stated in HPI.    Histories:     Medical/Surgical:  There are no problems to display for this patient.      has no past surgical history on file.    No past medical history on file.    Current Outpatient Medications on File Prior to Visit   Medication Sig Dispense Refill    zinc oxide 40 % PSTE paste Apply 1 Application topically 4 times daily as needed (for diaper rash) (Patient not taking: Reported on 1/7/2025) 99 g 0    Infant Foods (SIMILAC PRO-TOTAL COMFORT) POWD Take 24 oz by mouth daily (Patient not taking: Reported on 1/7/2025) 2 each 0     No current facility-administered medications on file prior to visit.        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  s/p NM cytotec 1000mcg  -rpt cbc in am  -pt made aware of cardio-sx of acute blood loss anemia  -at this time pt wants conservative management of the asymptomatic anemia  -pain meds prn  -dvt ppx  -ambulation PA NOTE:  POD#1 rpt csection   HIV + undetectable viral load last test 8/4/17   on HIV meds regimen  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